# Patient Record
Sex: MALE | Race: WHITE | Employment: OTHER | URBAN - METROPOLITAN AREA
[De-identification: names, ages, dates, MRNs, and addresses within clinical notes are randomized per-mention and may not be internally consistent; named-entity substitution may affect disease eponyms.]

---

## 2017-01-03 RX ORDER — METOPROLOL SUCCINATE 25 MG/1
TABLET, EXTENDED RELEASE ORAL
Qty: 30 TAB | Refills: 6 | Status: SHIPPED | OUTPATIENT
Start: 2017-01-03 | End: 2017-07-07 | Stop reason: SDUPTHER

## 2017-07-07 ENCOUNTER — OFFICE VISIT (OUTPATIENT)
Dept: CARDIOLOGY CLINIC | Age: 72
End: 2017-07-07

## 2017-07-07 VITALS
HEIGHT: 72 IN | BODY MASS INDEX: 29.96 KG/M2 | HEART RATE: 64 BPM | RESPIRATION RATE: 16 BRPM | WEIGHT: 221.2 LBS | DIASTOLIC BLOOD PRESSURE: 70 MMHG | SYSTOLIC BLOOD PRESSURE: 130 MMHG | OXYGEN SATURATION: 97 %

## 2017-07-07 DIAGNOSIS — I34.0 NON-RHEUMATIC MITRAL REGURGITATION: ICD-10-CM

## 2017-07-07 DIAGNOSIS — I48.0 PAROXYSMAL ATRIAL FIBRILLATION (HCC): Primary | ICD-10-CM

## 2017-07-07 DIAGNOSIS — Z86.79 S/P ABLATION OF ATRIAL FIBRILLATION: ICD-10-CM

## 2017-07-07 DIAGNOSIS — E78.2 MIXED HYPERLIPIDEMIA: ICD-10-CM

## 2017-07-07 DIAGNOSIS — I35.1 NONRHEUMATIC AORTIC VALVE INSUFFICIENCY: ICD-10-CM

## 2017-07-07 DIAGNOSIS — Z98.890 S/P ABLATION OF ATRIAL FIBRILLATION: ICD-10-CM

## 2017-07-07 RX ORDER — METOPROLOL SUCCINATE 25 MG/1
TABLET, EXTENDED RELEASE ORAL
Qty: 90 TAB | Refills: 4 | Status: SHIPPED | OUTPATIENT
Start: 2017-07-07 | End: 2018-08-01 | Stop reason: SDUPTHER

## 2017-07-07 RX ORDER — PRAVASTATIN SODIUM 40 MG/1
40 TABLET ORAL
Refills: 0 | COMMUNITY
Start: 2017-06-28

## 2017-07-07 NOTE — PROGRESS NOTES
Subjective/HPI:     Ligia Stains. is a 66 y.o. male is here for f/u appt. The patient denies chest pain/ shortness of breath, orthopnea, PND, LE edema, palpitations, syncope, presyncope or fatigue. Overall been doing well. PCP Provider  Maritza Araujo MD  Past Medical History:   Diagnosis Date    A-fib [facility])     s/p afib ablation    Arthritis     hands,knees    CAD (coronary artery disease)       Past Surgical History:   Procedure Laterality Date    CARDIAC SURG PROCEDURE UNLIST      ablation for afib    HX GI      hemorroidectomy    HX HERNIA REPAIR      HX OTHER SURGICAL      monitoring device under skin in chest to dx atrial fibrillation    HX TONSILLECTOMY      HX VASECTOMY       Allergies   Allergen Reactions    Claritin [Loratadine] Other (comments)     \"stop urination\"      Family History   Problem Relation Age of Onset    Heart Disease Father       Current Outpatient Prescriptions   Medication Sig    pravastatin (PRAVACHOL) 40 mg tablet Take 40 mg by mouth.  metoprolol succinate (TOPROL-XL) 25 mg XL tablet take 1 tablet by mouth once daily    ibuprofen (ADVIL) 200 mg tablet Take 600 mg by mouth as needed for Pain.  tadalafil (CIALIS) 5 mg tablet Take 5 mg by mouth every other day.  aspirin 81 mg tablet Take 81 mg by mouth daily. No current facility-administered medications for this visit. Vitals:    07/07/17 0943   BP: 130/70   Pulse: 64   Resp: 16   SpO2: 97%   Weight: 221 lb 3.2 oz (100.3 kg)   Height: 6' (1.829 m)     Social History     Social History    Marital status:      Spouse name: N/A    Number of children: N/A    Years of education: N/A     Occupational History    Not on file.      Social History Main Topics    Smoking status: Former Smoker     Packs/day: 1.00     Years: 8.00     Types: Cigarettes     Quit date: 7/13/1973    Smokeless tobacco: Never Used      Comment: Former cigarette smoker    Alcohol use 3.5 oz/week     7 Cans of beer per week      Comment: 1 beer a day    Drug use: No    Sexual activity: Not on file     Other Topics Concern    Not on file     Social History Narrative       I have reviewed the nurses notes, vitals, problem list, allergy list, medical history, family, social history and medications. Review of Symptoms:    General: Pt denies excessive weight gain or loss. Pt is able to conduct ADL's  HEENT: Denies blurred vision, headaches, epistaxis and difficulty swallowing. Respiratory: Denies shortness of breath, KULKARNI, wheezing or stridor. Cardiovascular: Denies precordial pain, palpitations, edema or PND  Gastrointestinal: Denies poor appetite, indigestion, abdominal pain or blood in stool  Urinary: Denies dysuria, pyuria  Musculoskeletal: Denies pain or swelling from muscles or joints  Neurologic: Denies tremor, paresthesias, or sensory motor disturbance  Skin: Denies rash, itching or texture change. Psych: Denies depression        Physical Exam:      General: Well developed, in no acute distress, cooperative and alert  HEENT: No carotid bruits, no JVD, trach is midline. Neck Supple, PEERL, EOM intact. Heart:  Normal S1/S2 negative S3 or S4. Regular, +GR I/II JULIAN, no gallop or rub.   Respiratory: Clear bilaterally x 4, no wheezing or rales  Abdomen:   Soft, non-tender, no masses, bowel sounds are active.   Extremities:  No edema, normal cap refill, no cyanosis, atraumatic. Neuro: A&Ox3, speech clear, gait stable. Skin: Skin color is normal. No rashes or lesions.  Non diaphoretic  Vascular: 2+ pulses symmetric in all extremities    Cardiographics    ECG: SR, HR 64    Results for orders placed or performed during the hospital encounter of 10/13/14   EKG, 12 LEAD, INITIAL   Result Value Ref Range    Ventricular Rate 80 BPM    Atrial Rate 80 BPM    P-R Interval 174 ms    QRS Duration 98 ms    Q-T Interval 372 ms    QTC Calculation (Bezet) 429 ms    Calculated P Axis 44 degrees    Calculated R Axis -21 degrees    Calculated T Axis 27 degrees    Diagnosis       Normal sinus rhythm  Minimal voltage criteria for LVH, may be normal variant  Confirmed by Antoinette Allen ([id number]) on 10/15/2014 9:49:44 AM         Cardiology Labs:  No results found for: CHOL, CHOLX, CHLST, CHOLV, 264164, HDL, LDL, LDLC, DLDLP, Merla Reece, CHHD, [facility]    Lab Results   Component Value Date/Time    Sodium 142 10/13/2014 11:40 PM    Potassium 4.7 10/13/2014 11:40 PM    Chloride 106 10/13/2014 11:40 PM    CO2 32 10/13/2014 11:40 PM    Anion gap 4 10/13/2014 11:40 PM    Glucose 122 10/13/2014 11:40 PM    BUN 14 10/13/2014 11:40 PM    Creatinine 1.06 10/13/2014 11:40 PM    BUN/Creatinine ratio 13 10/13/2014 11:40 PM    GFR est AA >60 10/13/2014 11:40 PM    GFR est non-AA >60 10/13/2014 11:40 PM    Calcium 9.4 10/13/2014 11:40 PM    AST (SGOT) 18 10/13/2014 11:40 PM    Alk. phosphatase 116 10/13/2014 11:40 PM    Protein, total 7.2 10/13/2014 11:40 PM    Albumin 3.7 10/13/2014 11:40 PM    Globulin 3.5 10/13/2014 11:40 PM    A-G Ratio 1.1 10/13/2014 11:40 PM    ALT (SGPT) 25 10/13/2014 11:40 PM           Assessment:     Assessment:     Zenaida Hernandez was seen today for cholesterol problem. Diagnoses and all orders for this visit:    Paroxysmal atrial fibrillation (HCC)  -     metoprolol succinate (TOPROL-XL) 25 mg XL tablet; take 1 tablet by mouth once daily    S/P ablation of atrial fibrillation    Mixed hyperlipidemia  -     AMB POC EKG ROUTINE W/ 12 LEADS, INTER & REP    Non-rheumatic mitral regurgitation    Nonrheumatic aortic valve insufficiency        ICD-10-CM ICD-9-CM    1. Paroxysmal atrial fibrillation (HCC) I48.0 427.31 metoprolol succinate (TOPROL-XL) 25 mg XL tablet   2. S/P ablation of atrial fibrillation Z98.890 V45.89     Z86.79     3. Mixed hyperlipidemia E78.2 272.2 pravastatin (PRAVACHOL) 40 mg tablet      AMB POC EKG ROUTINE W/ 12 LEADS, INTER & REP   4. Non-rheumatic mitral regurgitation I34.0 424.0    5.  Nonrheumatic aortic valve insufficiency I35.1 424.1      Orders Placed This Encounter    AMB POC EKG ROUTINE W/ 12 LEADS, INTER & REP     Order Specific Question:   Reason for Exam:     Answer:   routine    pravastatin (PRAVACHOL) 40 mg tablet     Sig: Take 40 mg by mouth. Refill:  0    metoprolol succinate (TOPROL-XL) 25 mg XL tablet     Sig: take 1 tablet by mouth once daily     Dispense:  90 Tab     Refill:  4        Plan:     Patient presents today for annual f/u appt and denies cardiac complaints. He remains in SR with hx of afib ablation. BP is normotensive. Lipids and labs managed by PCP, LDL 87, LDL 44, trig 134. Continue current care and f/u in 1 year. Chito Holt NP      [facility]    7/7/2017         Agree with note as outlined by  NP. I confirm findings in history and physical exam. No additional findings noted. Agree with plan as outlined above.      [address] MD

## 2017-07-07 NOTE — MR AVS SNAPSHOT
Visit Information Date & Time Provider Department Dept. Phone Encounter #  
 7/7/2017  9:45 AM [address], MD [facility] [phone] [id number] Upcoming Health Maintenance Date Due DTaP/Tdap/Td series (1 - Tdap) 8/31/1965 FOBT Q 1 YEAR AGE 50-75 8/31/1994 ZOSTER VACCINE AGE 60> 8/31/2004 GLAUCOMA SCREENING Q2Y 8/31/2009 MEDICARE YEARLY EXAM 8/31/2009 Pneumococcal 65+ Low/Medium Risk (2 of 2 - PPSV23) 1/1/2013 INFLUENZA AGE 9 TO ADULT 8/1/2017 Allergies as of 7/7/2017  Review Complete On: 7/7/2017 By: Regis Dent NP Severity Noted Reaction Type Reactions Claritin [Loratadine]  04/17/2012    Other (comments) \"stop urination\" Current Immunizations  Never Reviewed Name Date Influenza Vaccine Split  Deferred (Patient Refused) Pneumococcal Vaccine (Unspecified Type) 1/1/2008 Not reviewed this visit You Were Diagnosed With   
  
 Codes Comments Paroxysmal atrial fibrillation (HCC)    -  Primary ICD-10-CM: I48.0 ICD-9-CM: 427.31 S/P ablation of atrial fibrillation     ICD-10-CM: Z98.890, Z86.79 
ICD-9-CM: V45.89 Mixed hyperlipidemia     ICD-10-CM: E78.2 ICD-9-CM: 272.2 Non-rheumatic mitral regurgitation     ICD-10-CM: I34.0 ICD-9-CM: 424.0 Nonrheumatic aortic valve insufficiency     ICD-10-CM: I35.1 ICD-9-CM: 424.1 Vitals BP Pulse Resp Height(growth percentile) Weight(growth percentile) SpO2  
 130/70 (BP Patient Position: Sitting) 64 16 6' (1.829 m) 221 lb 3.2 oz (100.3 kg) 97% BMI Smoking Status 30 kg/m2 Former Smoker BMI and BSA Data Body Mass Index Body Surface Area  
 30 kg/m 2 2.26 m 2 Preferred Pharmacy [facility] Phone [id number] [address] [facility] [phone] Your Updated Medication List  
  
   
 This list is accurate as of: 7/7/17 10:08 AM.  Always use your most recent med list. ADVIL 200 mg tablet Generic drug:  ibuprofen Take 600 mg by mouth as needed for Pain. aspirin 81 mg tablet Take 81 mg by mouth daily. CIALIS 5 mg tablet Generic drug:  tadalafil Take 5 mg by mouth every other day. metoprolol succinate 25 mg XL tablet Commonly known as:  TOPROL-XL  
take 1 tablet by mouth once daily  
  
 pravastatin 40 mg tablet Commonly known as:  PRAVACHOL Take 40 mg by mouth. Prescriptions Sent to Pharmacy Refills  
 metoprolol succinate (TOPROL-XL) 25 mg XL tablet 4 Sig: take 1 tablet by mouth once daily Class: Normal  
 Pharmacy: [facility] [address], [address] [place] [address] #: [phone] We Performed the Following AMB POC EKG ROUTINE W/ 12 LEADS, INTER & REP [08040 CPT(R)] Introducing [facility] & HEALTH SERVICES! [employer] introduces [employer] patient portal. Now you can access parts of your medical record, email your doctor's office, and request medication refills online. 1. In your internet browser, go to [email or web address] 2. Click on the First Time User? Click Here link in the Sign In box. You will see the New Member Sign Up page. 3. Enter your [employer] Access Code exactly as it appears below. You will not need to use this code after youve completed the sign-up process. If you do not sign up before the expiration date, you must request a new code. · [employer] Access Code: [email or web address] Expires: 10/5/2017 10:08 AM 
 
4. Enter the last four digits of your Social Security Number (xxxx) and Date of Birth (mm/dd/yyyy) as indicated and click Submit. You will be taken to the next sign-up page. 5. Create a [employer] ID. This will be your [employer] login ID and cannot be changed, so think of one that is secure and easy to remember. 6. Create a [employer] password. You can change your password at any time. 7. Enter your Password Reset Question and Answer. This can be used at a later time if you forget your password. 8. Enter your e-mail address. You will receive e-mail notification when new information is available in [address] 9. Click Sign Up. You can now view and download portions of your medical record. 10. Click the Download Summary menu link to download a portable copy of your medical information. If you have questions, please visit the Frequently Asked Questions section of the [employer] website. Remember, [employer] is NOT to be used for urgent needs. For medical emergencies, dial 911. Now available from your iPhone and Android! Please provide this summary of care documentation to your next provider. Your primary care clinician is listed as [address]. If you have any questions after today's visit, please call [phone].

## 2018-08-01 ENCOUNTER — OFFICE VISIT (OUTPATIENT)
Dept: CARDIOLOGY CLINIC | Age: 73
End: 2018-08-01

## 2018-08-01 VITALS
BODY MASS INDEX: 30.83 KG/M2 | HEART RATE: 74 BPM | WEIGHT: 227.6 LBS | SYSTOLIC BLOOD PRESSURE: 132 MMHG | OXYGEN SATURATION: 99 % | DIASTOLIC BLOOD PRESSURE: 84 MMHG | HEIGHT: 72 IN

## 2018-08-01 DIAGNOSIS — R06.02 SOB (SHORTNESS OF BREATH): Primary | ICD-10-CM

## 2018-08-01 DIAGNOSIS — I48.0 PAROXYSMAL ATRIAL FIBRILLATION (HCC): ICD-10-CM

## 2018-08-01 DIAGNOSIS — E78.2 MIXED HYPERLIPIDEMIA: ICD-10-CM

## 2018-08-01 RX ORDER — METOPROLOL SUCCINATE 25 MG/1
TABLET, EXTENDED RELEASE ORAL
Qty: 90 TAB | Refills: 4 | Status: SHIPPED | OUTPATIENT
Start: 2018-08-01 | End: 2019-08-16 | Stop reason: SDUPTHER

## 2018-08-01 NOTE — PROGRESS NOTES
Chief Complaint Patient presents with  Irregular Heart Beat ANNUAL F/U  Shortness of Breath 1. Have you been to the ER, urgent care clinic since your last visit? Hospitalized since your last visit? NO 
 
2. Have you seen or consulted any other health care providers outside of the [address] [place] [address] since your last visit? Include any pap smears or colon screening.  NO

## 2018-08-01 NOTE — MR AVS SNAPSHOT
[address] [facility] 
[phone] Patient: Kandis Klinefelter MRN: [id number] Visit Information Date & Time Provider Department Dept. Phone Encounter #  
 8/1/2018  9:15 AM [address], [address] [place] [address] [facility] [phone] [id number] Your Appointments 8/10/2018  8:00 AM  
NUCLEAR MEDICINE with NUCLEAR, [facility]) Appt Note: p/Dr R; STRESS TEST LEXISCAN/CARDIOLITE [[id number]] (Order [id number])  6ft 227lbs bg  
 [address]  
[phone] [address] Upcoming Health Maintenance Date Due DTaP/Tdap/Td series (1 - Tdap) 8/31/1965 FOBT Q 1 YEAR AGE 50-75 8/31/1994 ZOSTER VACCINE AGE 60> 6/30/2004 GLAUCOMA SCREENING Q2Y 8/31/2009 Pneumococcal 65+ Low/Medium Risk (2 of 2 - PPSV23) 1/1/2013 MEDICARE YEARLY EXAM 3/14/2018 Influenza Age 5 to Adult 8/1/2018 Allergies as of 8/1/2018  Review Complete On: 8/1/2018 By: Lamar Costa Severity Noted Reaction Type Reactions Claritin [Loratadine]  04/17/2012    Other (comments) \"stop urination\" Current Immunizations  Never Reviewed Name Date Influenza Vaccine Split  Deferred (Patient Refused) ZZZ-RETIRED (DO NOT USE) Pneumococcal Vaccine (Unspecified Type) 1/1/2008 Not reviewed this visit You Were Diagnosed With   
  
 Codes Comments SOB (shortness of breath)    -  Primary ICD-10-CM: R06.02 
ICD-9-CM: 786.05 Paroxysmal atrial fibrillation (HCC)     ICD-10-CM: I48.0 ICD-9-CM: 427.31 Mixed hyperlipidemia     ICD-10-CM: E78.2 ICD-9-CM: 272.2 Vitals  BP Pulse Height(growth percentile) Weight(growth percentile) SpO2 BMI  
 132/84 (BP 1 Location: Right arm, BP Patient Position: Sitting) 74 6' (1.829 m) 227 lb 9.6 oz (103.2 kg) 99% 30.87 kg/m2 Smoking Status Former Smoker Vitals History BMI and BSA Data Body Mass Index Body Surface Area  
 30.87 kg/m 2 2.29 m 2 Preferred Pharmacy [facility] Phone [id number] [address] [phone] Your Updated Medication List  
  
   
This list is accurate as of 8/1/18 10:31 AM.  Always use your most recent med list. ADVIL 200 mg tablet Generic drug:  ibuprofen Take 600 mg by mouth as needed for Pain. aspirin 81 mg tablet Take 81 mg by mouth daily. CIALIS 5 mg tablet Generic drug:  tadalafil Take 5 mg by mouth every other day. metoprolol succinate 25 mg XL tablet Commonly known as:  TOPROL-XL  
take 1 tablet by mouth once daily  
  
 pravastatin 40 mg tablet Commonly known as:  PRAVACHOL Take 40 mg by mouth. Prescriptions Sent to Pharmacy Refills  
 metoprolol succinate (TOPROL-XL) 25 mg XL tablet 4 Sig: take 1 tablet by mouth once daily Class: Normal  
 Pharmacy: [id number] [address], [address] [place] [address] #: [phone] We Performed the Following AMB POC EKG ROUTINE W/ 12 LEADS, INTER & REP [57217 CPT(R)] To-Do List   
 Around 08/02/2018 ECG:  STRESS TEST LEXISCAN/CARDIOLITE Introducing \[facility]\"" & HEALTH SERVICES! [employer] introduces [employer] patient portal. Now you can access parts of your medical record, email your doctor's office, and request medication refills online. 1. In your internet browser, go to [email or web address]). [email or web address] ([employer]) 2. Click on the First Time User? Click Here link in the Sign In box. You will see the New Member Sign Up page. 3. Enter your [employer] Access Code exactly as it appears below. You will not need to use this code after youve completed the sign-up process.  If you do not sign up before the expiration date, you must request a new code. · [employer] Access Code: [email or web address] Expires: 10/30/2018  8:59 AM 
 
4. Enter the last four digits of your Social Security Number (xxxx) and Date of Birth (mm/dd/yyyy) as indicated and click Submit. You will be taken to the next sign-up page. 5. Create a [employer] ID. This will be your [employer] login ID and cannot be changed, so think of one that is secure and easy to remember. 6. Create a [employer] password. You can change your password at any time. 7. Enter your Password Reset Question and Answer. This can be used at a later time if you forget your password. 8. Enter your e-mail address. You will receive e-mail notification when new information is available in [address] 9. Click Sign Up. You can now view and download portions of your medical record. 10. Click the Download Summary menu link to download a portable copy of your medical information. If you have questions, please visit the Frequently Asked Questions section of the [employer] website. Remember, [employer] is NOT to be used for urgent needs. For medical emergencies, dial 911. Now available from your iPhone and Android! Please provide this summary of care documentation to your next provider. Your primary care clinician is listed as [address]. If you have any questions after today's visit, please call [phone].

## 2018-08-06 NOTE — PROGRESS NOTES
NAME:  Jossie Bedolla   :      MRN:   [id number]   PCP:  Sarah Perez MD           Subjective: The patient is a 67y.o. year old male  who returns for a routine follow-up. Since the last visit, patient reports no change in exercise tolerance, chest pain, edema, medication intolerance, palpitations,  PND/orthopnea wheezing, sputum, syncope, dizziness or light headedness. C/o SOB with exertion. Past Medical History:   Diagnosis Date    A-fib [facility])     s/p afib ablation    Arthritis     hands,knees    CAD (coronary artery disease)         ICD-10-CM ICD-9-CM    1. SOB (shortness of breath) R06.02 786.05 STRESS TEST LEXISCAN/CARDIOLITE   2. Paroxysmal atrial fibrillation (HCC) I48.0 427.31 AMB POC EKG ROUTINE W/ 12 LEADS, INTER & REP      metoprolol succinate (TOPROL-XL) 25 mg XL tablet   3. Mixed hyperlipidemia E78.2 272.2       Social History   Substance Use Topics    Smoking status: Former Smoker     Packs/day: 1.00     Years: 8.00     Types: Cigarettes     Quit date: 1973    Smokeless tobacco: Never Used      Comment: Former cigarette smoker    Alcohol use 3.5 oz/week     7 Cans of beer per week      Comment: 1 beer a day      Family History   Problem Relation Age of Onset    Heart Disease Father         Review of Systems  General: Pt denies excessive weight gain or loss. Pt is able to conduct ADL's  HEENT: Denies blurred vision, headaches, epistaxis and difficulty swallowing. Respiratory: Denies shortness of breath, KUKLARNI, wheezing or stridor. Cardiovascular: Denies precordial pain, palpitations, edema or PND  Gastrointestinal: Denies poor appetite, indigestion, abdominal pain or blood in stool  Musculoskeletal: Denies pain or swelling from muscles or joints  Neurologic: Denies tremor, paresthesias, or sensory motor disturbance  Skin: Denies rash, itching or texture change.     Objective:       Vitals:    18 0912 18 0918   BP: 136/84 132/84 Pulse: 74    SpO2: 99%    Weight: 227 lb 9.6 oz (103.2 kg)    Height: 6' (1.829 m)     Body mass index is 30.87 kg/(m^2). General PE  Mental Status - Alert. General Appearance - Not in acute distress. Chest and Lung Exam   Inspection: Accessory muscles - No use of accessory muscles in breathing. Auscultation:   Breath sounds: - Normal.    Cardiovascular   Inspection: Jugular vein - Bilateral - Inspection Normal.  Palpation/Percussion:   Apical Impulse: - Normal.  Auscultation: Rhythm - Regular. Heart Sounds - S1 WNL and S2 WNL. No S3 or S4. Murmurs & Other Heart Sounds: Auscultation of the heart reveals - No Murmurs. Peripheral Vascular   Upper Extremity: Inspection - Bilateral - No Cyanotic nailbeds or Digital clubbing. Lower Extremity:   Palpation: Edema - Bilateral - No edema. Data Review:     EKG -EKG: normal EKG, normal sinus rhythm, unchanged from previous tracings. Medications reviewed  Current Outpatient Prescriptions   Medication Sig    metoprolol succinate (TOPROL-XL) 25 mg XL tablet take 1 tablet by mouth once daily    pravastatin (PRAVACHOL) 40 mg tablet Take 40 mg by mouth.  ibuprofen (ADVIL) 200 mg tablet Take 600 mg by mouth as needed for Pain.  tadalafil (CIALIS) 5 mg tablet Take 5 mg by mouth every other day.  aspirin 81 mg tablet Take 81 mg by mouth daily. No current facility-administered medications for this visit. Assessment:       ICD-10-CM ICD-9-CM    1. SOB (shortness of breath) R06.02 786.05 STRESS TEST LEXISCAN/CARDIOLITE   2. Paroxysmal atrial fibrillation (HCC) I48.0 427.31 AMB POC EKG ROUTINE W/ 12 LEADS, INTER & REP      metoprolol succinate (TOPROL-XL) 25 mg XL tablet   3. Mixed hyperlipidemia E78.2 272.2         Plan:     Patient presents with SOB with exertion. He is maintaining sinus rhythm. Will evaluate for ischemia as noted.  F/u in 6 months if normal.    Florencia Amor MD

## 2018-08-10 ENCOUNTER — CLINICAL SUPPORT (OUTPATIENT)
Dept: CARDIOLOGY CLINIC | Age: 73
End: 2018-08-10

## 2018-08-10 DIAGNOSIS — R06.02 SOB (SHORTNESS OF BREATH): ICD-10-CM

## 2018-08-14 NOTE — PROGRESS NOTES
Spoke with Palmira Araujo on HIPAA  Regarding STRESS test results. Verified patient with two identifiers.

## 2019-07-07 ENCOUNTER — APPOINTMENT (OUTPATIENT)
Dept: CT IMAGING | Age: 74
End: 2019-07-07
Payer: MEDICARE

## 2019-07-07 ENCOUNTER — HOSPITAL ENCOUNTER (EMERGENCY)
Age: 74
Discharge: HOME | End: 2019-07-07
Attending: EMERGENCY MEDICINE
Payer: MEDICARE

## 2019-07-07 VITALS
HEART RATE: 67 BPM | RESPIRATION RATE: 16 BRPM | HEIGHT: 72 IN | WEIGHT: 217.59 LBS | TEMPERATURE: 98 F | OXYGEN SATURATION: 96 % | DIASTOLIC BLOOD PRESSURE: 74 MMHG | SYSTOLIC BLOOD PRESSURE: 161 MMHG | BODY MASS INDEX: 29.47 KG/M2

## 2019-07-07 DIAGNOSIS — N20.0 KIDNEY STONE: Primary | ICD-10-CM

## 2019-07-07 DIAGNOSIS — R10.9 ACUTE RIGHT FLANK PAIN: ICD-10-CM

## 2019-07-07 LAB
ANION GAP SERPL CALC-SCNC: 4 MMOL/L (ref 5–15)
APPEARANCE UR: CLEAR
BACTERIA URNS QL MICRO: NEGATIVE /HPF
BASOPHILS # BLD: 0 K/UL (ref 0–0.1)
BASOPHILS NFR BLD: 0 % (ref 0–1)
BILIRUB UR QL: NEGATIVE
BUN SERPL-MCNC: 20 MG/DL (ref 6–20)
BUN/CREAT SERPL: 12 (ref 12–20)
CALCIUM SERPL-MCNC: 9.2 MG/DL (ref 8.5–10.1)
CHLORIDE SERPL-SCNC: 105 MMOL/L (ref 97–108)
CO2 SERPL-SCNC: 30 MMOL/L (ref 21–32)
COLOR UR: ABNORMAL
CREAT SERPL-MCNC: 1.69 MG/DL (ref 0.7–1.3)
DIFFERENTIAL METHOD BLD: NORMAL
EOSINOPHIL # BLD: 0.1 K/UL (ref 0–0.4)
EOSINOPHIL NFR BLD: 1 % (ref 0–7)
EPITH CASTS URNS QL MICRO: ABNORMAL /LPF
ERYTHROCYTE [DISTWIDTH] IN BLOOD BY AUTOMATED COUNT: 13.1 % (ref 11.5–14.5)
GLUCOSE SERPL-MCNC: 139 MG/DL (ref 65–100)
GLUCOSE UR STRIP.AUTO-MCNC: NEGATIVE MG/DL
HCT VFR BLD AUTO: 47.9 % (ref 36.6–50.3)
HGB BLD-MCNC: 15.7 G/DL (ref 12.1–17)
HGB UR QL STRIP: ABNORMAL
IMM GRANULOCYTES # BLD AUTO: 0 K/UL (ref 0–0.04)
IMM GRANULOCYTES NFR BLD AUTO: 0 % (ref 0–0.5)
KETONES UR QL STRIP.AUTO: NEGATIVE MG/DL
LEUKOCYTE ESTERASE UR QL STRIP.AUTO: ABNORMAL
LYMPHOCYTES # BLD: 1.4 K/UL (ref 0.8–3.5)
LYMPHOCYTES NFR BLD: 16 % (ref 12–49)
MCH RBC QN AUTO: 30.1 PG (ref 26–34)
MCHC RBC AUTO-ENTMCNC: 32.8 G/DL (ref 30–36.5)
MCV RBC AUTO: 91.8 FL (ref 80–99)
MONOCYTES # BLD: 0.9 K/UL (ref 0–1)
MONOCYTES NFR BLD: 10 % (ref 5–13)
NEUTS SEG # BLD: 6.5 K/UL (ref 1.8–8)
NEUTS SEG NFR BLD: 73 % (ref 32–75)
NITRITE UR QL STRIP.AUTO: NEGATIVE
NRBC # BLD: 0 K/UL (ref 0–0.01)
NRBC BLD-RTO: 0 PER 100 WBC
PH UR STRIP: 5.5 [PH] (ref 5–8)
PLATELET # BLD AUTO: 184 K/UL (ref 150–400)
PMV BLD AUTO: 10.4 FL (ref 8.9–12.9)
POTASSIUM SERPL-SCNC: 4.7 MMOL/L (ref 3.5–5.1)
PROT UR STRIP-MCNC: NEGATIVE MG/DL
RBC # BLD AUTO: 5.22 M/UL (ref 4.1–5.7)
RBC #/AREA URNS HPF: ABNORMAL /HPF (ref 0–5)
SODIUM SERPL-SCNC: 139 MMOL/L (ref 136–145)
SP GR UR REFRACTOMETRY: 1.02 (ref 1–1.03)
UA: UC IF INDICATED,UAUC: ABNORMAL
UROBILINOGEN UR QL STRIP.AUTO: 0.2 EU/DL (ref 0.2–1)
WBC # BLD AUTO: 9.1 K/UL (ref 4.1–11.1)
WBC URNS QL MICRO: ABNORMAL /HPF (ref 0–4)

## 2019-07-07 PROCEDURE — 85025 COMPLETE CBC W/AUTO DIFF WBC: CPT

## 2019-07-07 PROCEDURE — 74176 CT ABD & PELVIS W/O CONTRAST: CPT

## 2019-07-07 PROCEDURE — 74011250637 HC RX REV CODE- 250/637: Performed by: PHYSICIAN ASSISTANT

## 2019-07-07 PROCEDURE — 96374 THER/PROPH/DIAG INJ IV PUSH: CPT

## 2019-07-07 PROCEDURE — 80048 BASIC METABOLIC PNL TOTAL CA: CPT

## 2019-07-07 PROCEDURE — 36415 COLL VENOUS BLD VENIPUNCTURE: CPT

## 2019-07-07 PROCEDURE — 99283 EMERGENCY DEPT VISIT LOW MDM: CPT

## 2019-07-07 PROCEDURE — 81001 URINALYSIS AUTO W/SCOPE: CPT

## 2019-07-07 PROCEDURE — 74011250636 HC RX REV CODE- 250/636: Performed by: PHYSICIAN ASSISTANT

## 2019-07-07 RX ORDER — SODIUM CHLORIDE 0.9 % (FLUSH) 0.9 %
5-40 SYRINGE (ML) INJECTION AS NEEDED
Status: DISCONTINUED | OUTPATIENT
Start: 2019-07-07 | End: 2019-07-07 | Stop reason: HOSPADM

## 2019-07-07 RX ORDER — TAMSULOSIN HYDROCHLORIDE 0.4 MG/1
0.4 CAPSULE ORAL
Status: COMPLETED | OUTPATIENT
Start: 2019-07-07 | End: 2019-07-07

## 2019-07-07 RX ORDER — SODIUM CHLORIDE 0.9 % (FLUSH) 0.9 %
5-40 SYRINGE (ML) INJECTION EVERY 8 HOURS
Status: DISCONTINUED | OUTPATIENT
Start: 2019-07-07 | End: 2019-07-07 | Stop reason: HOSPADM

## 2019-07-07 RX ORDER — KETOROLAC TROMETHAMINE 30 MG/ML
15 INJECTION, SOLUTION INTRAMUSCULAR; INTRAVENOUS
Status: COMPLETED | OUTPATIENT
Start: 2019-07-07 | End: 2019-07-07

## 2019-07-07 RX ORDER — ONDANSETRON 4 MG/1
4 TABLET, ORALLY DISINTEGRATING ORAL
Qty: 10 TAB | Refills: 0 | Status: SHIPPED | OUTPATIENT
Start: 2019-07-07 | End: 2019-09-05

## 2019-07-07 RX ORDER — TRAMADOL HYDROCHLORIDE 50 MG/1
50 TABLET ORAL
Qty: 8 TAB | Refills: 0 | Status: SHIPPED | OUTPATIENT
Start: 2019-07-07 | End: 2019-07-12

## 2019-07-07 RX ADMIN — TAMSULOSIN HYDROCHLORIDE 0.4 MG: 0.4 CAPSULE ORAL at 15:14

## 2019-07-07 RX ADMIN — KETOROLAC TROMETHAMINE 15 MG: 30 INJECTION, SOLUTION INTRAMUSCULAR at 15:14

## 2019-07-07 NOTE — ED NOTES
Pt presents from triage with c/o intermittent R flank pain since Friday. States some mild nausea with pain. Denies urinary s/s, v/d, fever, or chills. Pt placed on monitor x2. VSS. Spouse at bedside.

## 2019-07-07 NOTE — ED NOTES
Discharge instructions given to patient by MARY Barragan. Pt verbalized understanding of discharge instructions. Pt discharged without difficulty. Pt discharged in stable condition via ambulation, accompanied by wife.

## 2019-07-07 NOTE — DISCHARGE INSTRUCTIONS
Rest, push fluids, follow up with Urology for recheck. Contact information provided for [facility] Hotline for follow up: [phone] Return to the Emergency Dept for any worsening pain, fever, nausea/vomiting, difficulty urinating.

## 2019-07-08 NOTE — ED PROVIDER NOTES
EMERGENCY DEPARTMENT HISTORY AND PHYSICAL EXAM      Date: 7/7/2019  Patient Name: Disha Flores    History of Presenting Illness     Chief Complaint   Patient presents with    Flank Pain     R flank/lower back pain, intermittent since Friday; denies any urinary sx's, denies n/v; denies hx of kidney stones       History Provided By: Patient    HPI: Disha Flores, 75 y.o. male presents ambulatory to the Emergency Dept with c/o R flank/low back pain since 7/5/19. Pt denied dysuria/hematuria. Pt denied h/o kidney stones but states he has multiple family members with stones. No fever/chills. He denied injury/strain or recent change in his physical routine. No N/V. He has been making his urine stream without difficulty. No rash/lesion. Pt is o/w healthy without cough, congestion, ST, shortness of breath, chest pain. Chief Complaint: R flank pain  Duration: 2 Days  Timing:  Acute  Location: R flank  Quality: Aching and Dull  Severity: Moderate  Modifying Factors: +family h/o kidney stones  Associated Symptoms: denies any other associated signs or symptoms        There are no other complaints, changes, or physical findings at this time. PCP: Mayi Colon MD    Current Outpatient Medications   Medication Sig Dispense Refill    traMADol (ULTRAM) 50 mg tablet Take 1 Tab by mouth every eight (8) hours as needed for Pain for up to 5 days. Max Daily Amount: 150 mg. 8 Tab 0    ondansetron (ZOFRAN ODT) 4 mg disintegrating tablet Take 1 Tab by mouth every eight (8) hours as needed for Nausea for up to 10 doses. 10 Tab 0    metoprolol succinate (TOPROL-XL) 25 mg XL tablet take 1 tablet by mouth once daily 90 Tab 4    pravastatin (PRAVACHOL) 40 mg tablet Take 40 mg by mouth.  0    ibuprofen (ADVIL) 200 mg tablet Take 600 mg by mouth as needed for Pain.  tadalafil (CIALIS) 5 mg tablet Take 5 mg by mouth every other day.  aspirin 81 mg tablet Take 81 mg by mouth daily.          Past History Past Medical History:  Past Medical History:   Diagnosis Date    A-fib [facility])     s/p afib ablation    Arthritis     hands,knees    CAD (coronary artery disease)        Past Surgical History:  Past Surgical History:   Procedure Laterality Date    CARDIAC SURG PROCEDURE UNLIST      ablation for afib    HX GI      hemorroidectomy    HX HERNIA REPAIR      HX OTHER SURGICAL      monitoring device under skin in chest to dx atrial fibrillation    HX TONSILLECTOMY      HX VASECTOMY         Family History:  Family History   Problem Relation Age of Onset    Heart Disease Father        Social History:  Social History     Tobacco Use    Smoking status: Former Smoker     Packs/day: 1.00     Years: 8.00     Pack years: 8.00     Types: Cigarettes     Last attempt to quit: 1973     Years since quittin.0    Smokeless tobacco: Never Used    Tobacco comment: Former cigarette smoker   Substance Use Topics    Alcohol use: Yes     Alcohol/week: 3.5 oz     Types: 7 Cans of beer per week     Comment: 1 beer a day    Drug use: No       Allergies: Allergies   Allergen Reactions    Claritin [Loratadine] Other (comments)     \"stop urination\"         Review of Systems   Review of Systems   Constitutional: Negative for chills and fever. HENT: Negative for congestion, rhinorrhea and sore throat. Respiratory: Negative for cough and shortness of breath. Cardiovascular: Negative for chest pain and palpitations. Gastrointestinal: Negative for abdominal pain, diarrhea, nausea and vomiting. Endocrine: Negative for polydipsia, polyphagia and polyuria. Genitourinary: Positive for flank pain. Negative for decreased urine volume, dysuria and hematuria. Musculoskeletal: Positive for back pain. Negative for neck pain and neck stiffness. Skin: Negative for rash and wound. Allergic/Immunologic: Negative for food allergies and immunocompromised state. Neurological: Negative for dizziness and headaches. Hematological: Negative for adenopathy. Does not bruise/bleed easily. Psychiatric/Behavioral: Negative for agitation and confusion. Physical Exam   Physical Exam   Constitutional: He is oriented to person, place, and time. He appears well-developed and well-nourished. No distress. WDWN elderly male, alert, in NAD   HENT:   Head: Normocephalic and atraumatic. Nose: Nose normal.   Mouth/Throat: Oropharynx is clear and moist. No oropharyngeal exudate. Eyes: Pupils are equal, round, and reactive to light. Conjunctivae and EOM are normal. Right eye exhibits no discharge. Left eye exhibits no discharge. No scleral icterus. Neck: Normal range of motion. Neck supple. No JVD present. No tracheal deviation present. No thyromegaly present. Cardiovascular: Normal rate, regular rhythm and normal heart sounds. Pulmonary/Chest: Effort normal and breath sounds normal. No respiratory distress. He has no wheezes. Abdominal: Soft. He exhibits no distension. There is no rebound and no guarding.   + R CVAT   Musculoskeletal: Normal range of motion. He exhibits no edema or tenderness. Lymphadenopathy:     He has no cervical adenopathy. Neurological: He is alert and oriented to person, place, and time. He exhibits normal muscle tone. Coordination normal.   Skin: Skin is warm and dry. No rash noted. He is not diaphoretic. No erythema. No pallor. Psychiatric: He has a normal mood and affect. His behavior is normal. Judgment normal.   Nursing note and vitals reviewed.       Diagnostic Study Results     Labs -     Recent Results (from the past 12 hour(s))   URINALYSIS W/ REFLEX CULTURE    Collection Time: 07/07/19  1:04 PM   Result Value Ref Range    Color YELLOW/STRAW      Appearance CLEAR CLEAR      Specific gravity 1.019 1.003 - 1.030      pH (UA) 5.5 5.0 - 8.0      Protein NEGATIVE  NEG mg/dL    Glucose NEGATIVE  NEG mg/dL    Ketone NEGATIVE  NEG mg/dL    Bilirubin NEGATIVE  NEG      Blood MODERATE (A) NEG Urobilinogen 0.2 0.2 - 1.0 EU/dL    Nitrites NEGATIVE  NEG      Leukocyte Esterase SMALL (A) NEG      WBC 0-4 0 - 4 /hpf    RBC 20-50 0 - 5 /hpf    Epithelial cells FEW FEW /lpf    Bacteria NEGATIVE  NEG /hpf    UA:UC IF INDICATED CULTURE NOT INDICATED BY UA RESULT CNI     CBC WITH AUTOMATED DIFF    Collection Time: 07/07/19  1:09 PM   Result Value Ref Range    WBC 9.1 4.1 - 11.1 K/uL    RBC 5.22 4.10 - 5.70 M/uL    HGB 15.7 12.1 - 17.0 g/dL    HCT 47.9 36.6 - 50.3 %    MCV 91.8 80.0 - 99.0 FL    MCH 30.1 26.0 - 34.0 PG    MCHC 32.8 30.0 - 36.5 g/dL    RDW 13.1 11.5 - 14.5 %    PLATELET 776 968 - 027 K/uL    MPV 10.4 8.9 - 12.9 FL    NRBC 0.0 0  WBC    ABSOLUTE NRBC 0.00 0.00 - 0.01 K/uL    NEUTROPHILS 73 32 - 75 %    LYMPHOCYTES 16 12 - 49 %    MONOCYTES 10 5 - 13 %    EOSINOPHILS 1 0 - 7 %    BASOPHILS 0 0 - 1 %    IMMATURE GRANULOCYTES 0 0.0 - 0.5 %    ABS. NEUTROPHILS 6.5 1.8 - 8.0 K/UL    ABS. LYMPHOCYTES 1.4 0.8 - 3.5 K/UL    ABS. MONOCYTES 0.9 0.0 - 1.0 K/UL    ABS. EOSINOPHILS 0.1 0.0 - 0.4 K/UL    ABS. BASOPHILS 0.0 0.0 - 0.1 K/UL    ABS. IMM. GRANS. 0.0 0.00 - 0.04 K/UL    DF AUTOMATED     METABOLIC PANEL, BASIC    Collection Time: 07/07/19  1:09 PM   Result Value Ref Range    Sodium 139 136 - 145 mmol/L    Potassium 4.7 3.5 - 5.1 mmol/L    Chloride 105 97 - 108 mmol/L    CO2 30 21 - 32 mmol/L    Anion gap 4 (L) 5 - 15 mmol/L    Glucose 139 (H) 65 - 100 mg/dL    BUN 20 6 - 20 MG/DL    Creatinine 1.69 (H) 0.70 - 1.30 MG/DL    BUN/Creatinine ratio 12 12 - 20      GFR est AA 48 (L) >60 ml/min/1.73m2    GFR est non-AA 40 (L) >60 ml/min/1.73m2    Calcium 9.2 8.5 - 10.1 MG/DL       Radiologic Studies -   CT ABD PELV WO CONT   Final Result   IMPRESSION:      Obstructing 4 mm calcified stone in the proximal right ureter with mild   prominence of the collecting system and mild surrounding inflammatory changes. Please refer to above findings for complete details.                CT Results  (Last 48 hours)               07/07/19 1425  CT ABD PELV WO CONT Final result    Impression:  IMPRESSION:       Obstructing 4 mm calcified stone in the proximal right ureter with mild   prominence of the collecting system and mild surrounding inflammatory changes. Please refer to above findings for complete details. Narrative:  INDICATION: Flank pain, recurrent stone disease suspected       COMPARISON: None       TECHNIQUE:    Thin axial images were obtained through the abdomen and pelvis without   intravenous iodinated contrast administration. Coronal and sagittal   reconstructions were generated. Oral contrast was not administered. CT dose   reduction was achieved through use of a standardized protocol tailored for this   examination and automatic exposure control for dose modulation. FINDINGS: Lack of IV contrast limits evaluation of the solid abdominal organs. LIVER: Low density right hepatic lobe lesion is too small to characterize (image   13). No other focal liver abnormality    GALLBLADDER: No calcified gallstone   SPLEEN: Unremarkable   PANCREAS: No mass or ductal dilatation. ADRENALS: Unremarkable. KIDNEYS/URETERS: 3.4 cm right renal cyst. No other evidence for focal renal   abnormality. There is a 4 mm calcified stone in the proximal right ureter with   minimal prominence of the collecting system and mild surrounding inflammatory   changes. Tiny nonobstructing right lower pole renal calculus. No left   hydronephrosis   PERITONEUM: No abdominal lymphadenopathy or ascites. COLON: Diverticulosis without evidence for diverticulitis   APPENDIX: Unremarkable. SMALL BOWEL: No dilatation or wall thickening. STOMACH: Unremarkable. PELVIS: No pelvic lymphadenopathy or free fluid. Mildly enlarged prostate gland. The bladder is unremarkable   BONES: No destructive bone lesion.     VISUALIZED THORAX: 6 mm lung nodule along the left major fissure is not   significantly changed compared to 2012   ADDITIONAL COMMENTS: N/A                   Medical Decision Making   I am the first provider for this patient. I reviewed the vital signs, available nursing notes, past medical history, past surgical history, family history and social history. Vital Signs-Reviewed the patient's vital signs. Patient Vitals for the past 12 hrs:   Temp Pulse Resp BP SpO2   07/07/19 1459     96 %   07/07/19 1458    161/74    07/07/19 1409     96 %   07/07/19 1407  67 16 154/68 96 %   07/07/19 1406    154/68    07/07/19 1247 98 °F (36.7 °C) 85 16 153/78 98 %         Records Reviewed: Nursing Notes, Old Medical Records, Previous Radiology Studies and Previous Laboratory Studies    Provider Notes (Medical Decision Making):   Kidney stone, UTI, pyelonephritis, strain    ED Course:   Initial assessment performed. The patients presenting problems have been discussed, and they are in agreement with the care plan formulated and outlined with them. I have encouraged them to ask questions as they arise throughout their visit. DISCHARGE NOTE:  The care plan has been outline with the patient and/or family, who verbally conveyed understanding and agreement. Available results have been reviewed. Patient and/or family understand the follow up plan as outlined and discharge instructions. Should their condition deterioration at any time after discharge the patient agrees to return, follow up sooner than outlined or seek medical assistance at the closest Emergency Room as soon as possible. Questions have been answered. Discharge instructions and educational information regarding the patient's diagnosis as well a list of reasons why the patient would want to seek immediate medical attention, should their condition change, were reviewed directly with the patient/family       PLAN:  1.    Discharge Medication List as of 7/7/2019  3:12 PM      START taking these medications    Details   traMADol (ULTRAM) 50 mg tablet Take 1 Tab by mouth every eight (8) hours as needed for Pain for up to 5 days. Max Daily Amount: 150 mg., Print, Disp-8 Tab, R-0         CONTINUE these medications which have NOT CHANGED    Details   metoprolol succinate (TOPROL-XL) 25 mg XL tablet take 1 tablet by mouth once daily, Normal, Disp-90 Tab, R-4      pravastatin (PRAVACHOL) 40 mg tablet Take 40 mg by mouth., Historical Med, R-0      ibuprofen (ADVIL) 200 mg tablet Take 600 mg by mouth as needed for Pain., Historical Med      tadalafil (CIALIS) 5 mg tablet Take 5 mg by mouth every other day., Historical Med      aspirin 81 mg tablet Take 81 mg by mouth daily. Historical Med, 81 mg           2. Follow-up Information     Follow up With Specialties Details Why Contact Info    Pedro Luis Gutiérrez MD Urology   [address]  [address]  [facility].  [phone]      [facility] EMERGENCY DEPT Emergency Medicine  If symptoms worsen [address] [place] [address] Drive  [address] [facility]  [phone]        Return to ED if worse     Diagnosis     Clinical Impression:   1. Kidney stone    2.  Acute right flank pain

## 2019-08-16 DIAGNOSIS — I48.0 PAROXYSMAL ATRIAL FIBRILLATION (HCC): ICD-10-CM

## 2019-08-16 RX ORDER — METOPROLOL SUCCINATE 25 MG/1
TABLET, EXTENDED RELEASE ORAL
Qty: 60 TAB | Refills: 0 | Status: SHIPPED | OUTPATIENT
Start: 2019-08-16 | End: 2019-10-16 | Stop reason: SDUPTHER

## 2019-09-05 ENCOUNTER — OFFICE VISIT (OUTPATIENT)
Dept: CARDIOLOGY CLINIC | Age: 74
End: 2019-09-05

## 2019-09-05 VITALS
SYSTOLIC BLOOD PRESSURE: 134 MMHG | RESPIRATION RATE: 16 BRPM | BODY MASS INDEX: 29.7 KG/M2 | HEART RATE: 70 BPM | OXYGEN SATURATION: 97 % | HEIGHT: 72 IN | DIASTOLIC BLOOD PRESSURE: 72 MMHG | WEIGHT: 219.3 LBS

## 2019-09-05 DIAGNOSIS — E78.2 MIXED HYPERLIPIDEMIA: ICD-10-CM

## 2019-09-05 DIAGNOSIS — Z86.79 S/P ABLATION OF ATRIAL FIBRILLATION: ICD-10-CM

## 2019-09-05 DIAGNOSIS — R06.02 SOB (SHORTNESS OF BREATH): ICD-10-CM

## 2019-09-05 DIAGNOSIS — Z98.890 S/P ABLATION OF ATRIAL FIBRILLATION: ICD-10-CM

## 2019-09-05 DIAGNOSIS — I48.0 PAROXYSMAL ATRIAL FIBRILLATION (HCC): Primary | ICD-10-CM

## 2019-09-05 NOTE — PROGRESS NOTES
[address], [address] [facility]  [phone]     Subjective:      Ian Trejo is a 75 y.o. male is here for routine f/u on PAF HTN HLD. Has occasional mild sob, unchanged. He is able to do heavy yardwork, play golf; denies exertional sob or cp. Had a negative NST in 8/18  Has infrequent palpitation, < 1 minute, last episode was 3 months ago        The patient denies chest pain, orthopnea, PND, LE edema, palpitations, syncope, or presyncope.        Patient Active Problem List    Diagnosis Date Noted    Aortic valve insufficiency 10/06/2014    Mitral valve regurgitation 10/06/2014    Paroxysmal atrial fibrillation ([facility] [address] 04/15/2013    S/P ablation of atrial fibrillation 11/16/2012    A-fib ([facility] 10/16/2012    Heart palpitations 07/31/2012    Mixed hyperlipidemia 03/07/2012    Obesity, unspecified 03/07/2012    Prostatitis, unspecified 03/07/2012      Leelee Avila MD  Past Medical History:   Diagnosis Date    A-fib [facility])     s/p afib ablation    Arthritis     hands,knees    CAD (coronary artery disease)       Past Surgical History:   Procedure Laterality Date    CARDIAC SURG PROCEDURE UNLIST      ablation for afib    HX GI      hemorroidectomy    HX HERNIA REPAIR      HX OTHER SURGICAL      monitoring device under skin in chest to dx atrial fibrillation    HX TONSILLECTOMY      HX VASECTOMY       Allergies   Allergen Reactions    Claritin [Loratadine] Other (comments)     \"stop urination\"      Family History   Problem Relation Age of Onset    Heart Disease Father       Social History     Socioeconomic History    Marital status:      Spouse name: Not on file    Number of children: Not on file    Years of education: Not on file    Highest education level: Not on file   Occupational History    Not on file   Social Needs    Financial resource strain: Not on file    Food insecurity:     Worry: Not on file     Inability: Not on file   [employer] needs: Medical: Not on file     Non-medical: Not on file   Tobacco Use    Smoking status: Former Smoker     Packs/day: 1.00     Years: 8.00     Pack years: 8.00     Types: Cigarettes     Last attempt to quit: 1973     Years since quittin.1    Smokeless tobacco: Never Used    Tobacco comment: Former cigarette smoker   Substance and Sexual Activity    Alcohol use: Yes     Alcohol/week: 5.8 standard drinks     Types: 7 Cans of beer per week     Comment: 1 beer a day    Drug use: No    Sexual activity: Not on file   Lifestyle    Physical activity:     Days per week: Not on file     Minutes per session: Not on file    Stress: Not on file   Relationships    Social connections:     Talks on phone: Not on file     Gets together: Not on file     Attends Quaker service: Not on file     Active member of club or organization: Not on file     Attends meetings of clubs or organizations: Not on file     Relationship status: Not on file    Intimate partner violence:     Fear of current or ex partner: Not on file     Emotionally abused: Not on file     Physically abused: Not on file     Forced sexual activity: Not on file   Other Topics Concern    Not on file   Social History Narrative    Not on file      Current Outpatient Medications   Medication Sig    metoprolol succinate (TOPROL-XL) 25 mg XL tablet take 1 tablet by mouth once daily    pravastatin (PRAVACHOL) 40 mg tablet Take 40 mg by mouth.  ibuprofen (ADVIL) 200 mg tablet Take 600 mg by mouth as needed for Pain.  tadalafil (CIALIS) 5 mg tablet Take 5 mg by mouth every other day.  aspirin 81 mg tablet Take 81 mg by mouth daily. No current facility-administered medications for this visit.           Review of Symptoms:  11 systems reviewed, negative other than as stated in the HPI    Physical ExamPhysical Exam:    Vitals:    19 1338 19 1345 19 1402   BP: 144/80 138/70 134/72   Pulse: 70     Resp: 16     SpO2: 97% Weight: 219 lb 4.8 oz (99.5 kg)     Height: 6' (1.829 m)       Body mass index is 29.74 kg/m². General PE  Gen:  NAD  Mental Status - Alert. General Appearance - Not in acute distress. HEENT:  PERRL, no carotid bruits or JVD  Chest and Lung Exam   Inspection: Accessory muscles - No use of accessory muscles in breathing. Auscultation:   Breath sounds: - Normal.   Cardiovascular   Inspection: Jugular vein - Bilateral - Inspection Normal.   Palpation/Percussion:   Apical Impulse: - Normal.   Auscultation: Rhythm - Regular. Heart Sounds - S1 WNL and S2 WNL. No S3 or S4. Murmurs & Other Heart Sounds: Auscultation of the heart reveals - No Murmurs. Peripheral Vascular   Upper Extremity: Inspection - Bilateral - No Cyanotic nailbeds or Digital clubbing. Lower Extremity:   Palpation: Edema - Bilateral - No edema. Abdomen:   Soft, non-tender, bowel sounds are active. Neuro: A&O times 3, CN and motor grossly WNL    Labs:   No results found for: CHOL, CHOLX, CHLST, CHOLV, 539944, HDL, LDL, LDLC, DLDLP, TGLX, TRIGL, TRIGP, CHHD, CHHDX  No results found for: CPK, CPKX, CPX  Lab Results   Component Value Date/Time    Sodium 139 07/07/2019 01:09 PM    Potassium 4.7 07/07/2019 01:09 PM    Chloride 105 07/07/2019 01:09 PM    CO2 30 07/07/2019 01:09 PM    Anion gap 4 (L) 07/07/2019 01:09 PM    Glucose 139 (H) 07/07/2019 01:09 PM    BUN 20 07/07/2019 01:09 PM    Creatinine 1.69 (H) 07/07/2019 01:09 PM    BUN/Creatinine ratio 12 07/07/2019 01:09 PM    GFR est AA 48 (L) 07/07/2019 01:09 PM    GFR est non-AA 40 (L) 07/07/2019 01:09 PM    Calcium 9.2 07/07/2019 01:09 PM    Bilirubin, total 0.2 10/13/2014 11:40 PM    AST (SGOT) 18 10/13/2014 11:40 PM    Alk.  phosphatase 116 10/13/2014 11:40 PM    Protein, total 7.2 10/13/2014 11:40 PM    Albumin 3.7 10/13/2014 11:40 PM    Globulin 3.5 10/13/2014 11:40 PM    A-G Ratio 1.1 10/13/2014 11:40 PM    ALT (SGPT) 25 10/13/2014 11:40 PM       EKG:  NSR      Assessment:     Assessment: 1. Paroxysmal atrial fibrillation ([address]    2. Mixed hyperlipidemia    3. S/P ablation of atrial fibrillation    4. SOB (shortness of breath)        Orders Placed This Encounter    AMB POC EKG ROUTINE W/ 12 LEADS, INTER & REP     Order Specific Question:   Reason for Exam:     Answer:   routine        Plan:     Patient presents for f/u, doing well and stable from cardiac standpoint. Has occasional mild sob, unchanged for years. He is able to do heavy yardwork, play golf; denies exertional sob or cp. Had a negative NST in 8/18  Has infrequent palpitation, < 1 minute, last episode was 3 months ago. PAF Hx AF ablation in 2014  Normal EF, mild MR per echo in 10/14  2015: Event monitor: Frequent PAC's and PVC's that correlate with patient symptoms.    On BB, ASA  He will call us if palpitation increase in frequency, intensity or duration   Repeat echo    No ischemia per NST in 8/18    BP controlled    HLD  On statin  Lipids and labs followed by PCP. Continue current care and f/u in 6 months. Hardy Tesfaye NP       [facility]    9/5/2019         Patient seen, examined by me personally. Plan discussed as detailed. Agree with note as outlined by  NP. I confirm findings in history and physical exam. No additional findings noted. Agree with plan as outlined above.      Cayla Bolaños MD

## 2019-09-05 NOTE — PROGRESS NOTES
Chief Complaint   Patient presents with    Irregular Heart Beat     annual  f/u     1. Have you been to the ER, urgent care clinic since your last visit? Hospitalized since your last visit? yes ED [facility] ED 7/7/2019    2. Have you seen or consulted any other health care providers outside of the [address] [place] [address] since your last visit? Include any pap smears or colon screening.  No

## 2019-12-03 DIAGNOSIS — I48.0 PAROXYSMAL ATRIAL FIBRILLATION (HCC): ICD-10-CM

## 2019-12-03 RX ORDER — METOPROLOL SUCCINATE 25 MG/1
TABLET, EXTENDED RELEASE ORAL
Qty: 60 TAB | Refills: 0 | Status: SHIPPED | OUTPATIENT
Start: 2019-12-03 | End: 2020-02-10

## 2020-02-08 DIAGNOSIS — I48.0 PAROXYSMAL ATRIAL FIBRILLATION (HCC): ICD-10-CM

## 2020-02-10 DIAGNOSIS — I48.0 PAROXYSMAL ATRIAL FIBRILLATION (HCC): ICD-10-CM

## 2020-02-10 RX ORDER — METOPROLOL SUCCINATE 25 MG/1
TABLET, EXTENDED RELEASE ORAL
Qty: 90 TAB | Refills: 3 | Status: SHIPPED | OUTPATIENT
Start: 2020-02-10 | End: 2021-02-08

## 2020-02-10 RX ORDER — METOPROLOL SUCCINATE 25 MG/1
TABLET, EXTENDED RELEASE ORAL
Qty: 60 TAB | Refills: 0 | Status: SHIPPED | OUTPATIENT
Start: 2020-02-10 | End: 2020-02-10

## 2020-10-20 ENCOUNTER — OFFICE VISIT (OUTPATIENT)
Dept: CARDIOLOGY CLINIC | Age: 75
End: 2020-10-20

## 2020-10-20 ENCOUNTER — CLINICAL SUPPORT (OUTPATIENT)
Dept: CARDIOLOGY CLINIC | Age: 75
End: 2020-10-20
Payer: MEDICARE

## 2020-10-20 VITALS
HEART RATE: 78 BPM | OXYGEN SATURATION: 98 % | BODY MASS INDEX: 30.37 KG/M2 | RESPIRATION RATE: 18 BRPM | HEIGHT: 72 IN | SYSTOLIC BLOOD PRESSURE: 158 MMHG | WEIGHT: 224.2 LBS | DIASTOLIC BLOOD PRESSURE: 80 MMHG

## 2020-10-20 DIAGNOSIS — E78.2 MIXED HYPERLIPIDEMIA: ICD-10-CM

## 2020-10-20 DIAGNOSIS — R00.2 HEART PALPITATIONS: ICD-10-CM

## 2020-10-20 DIAGNOSIS — Z98.890 S/P ABLATION OF ATRIAL FIBRILLATION: ICD-10-CM

## 2020-10-20 DIAGNOSIS — I48.0 PAROXYSMAL ATRIAL FIBRILLATION (HCC): ICD-10-CM

## 2020-10-20 DIAGNOSIS — Z86.79 S/P ABLATION OF ATRIAL FIBRILLATION: ICD-10-CM

## 2020-10-20 DIAGNOSIS — R00.1 BRADYCARDIA: Primary | ICD-10-CM

## 2020-10-20 DIAGNOSIS — I48.0 PAROXYSMAL ATRIAL FIBRILLATION (HCC): Primary | ICD-10-CM

## 2020-10-20 PROCEDURE — G8427 DOCREV CUR MEDS BY ELIG CLIN: HCPCS | Performed by: INTERNAL MEDICINE

## 2020-10-20 PROCEDURE — G8536 NO DOC ELDER MAL SCRN: HCPCS | Performed by: INTERNAL MEDICINE

## 2020-10-20 PROCEDURE — 93000 ELECTROCARDIOGRAM COMPLETE: CPT | Performed by: INTERNAL MEDICINE

## 2020-10-20 PROCEDURE — 99214 OFFICE O/P EST MOD 30 MIN: CPT | Performed by: INTERNAL MEDICINE

## 2020-10-20 PROCEDURE — 1101F PT FALLS ASSESS-DOCD LE1/YR: CPT | Performed by: INTERNAL MEDICINE

## 2020-10-20 PROCEDURE — 93228 REMOTE 30 DAY ECG REV/REPORT: CPT | Performed by: INTERNAL MEDICINE

## 2020-10-20 PROCEDURE — G8417 CALC BMI ABV UP PARAM F/U: HCPCS | Performed by: INTERNAL MEDICINE

## 2020-10-20 PROCEDURE — G8510 SCR DEP NEG, NO PLAN REQD: HCPCS | Performed by: INTERNAL MEDICINE

## 2020-10-20 NOTE — LETTER
10/20/20 Patient: Jerry Joseph YOB: 1945 Date of Visit: 10/20/2020 [address] [employer] [address] VIA In Basket Dear [address] Marybeth Haley MD, Thank you for referring Mr. Brandt Moses to [address] for evaluation. My notes for this consultation are attached. If you have questions, please do not hesitate to call me. I look forward to following your patient along with you. Sincerely, Cristal Fink MD

## 2020-10-20 NOTE — PROGRESS NOTES
Patient received a 2 week event monitor. Instructions given verbally as well as an instruction sheet. Pt verbalized understanding.     [facility] Event Monitoring

## 2020-10-20 NOTE — PROGRESS NOTES
1. Have you been to the ER, urgent care clinic since your last visit? Hospitalized since your last visit? No    2. Have you seen or consulted any other health care providers outside of the [address] [place] [address] since your last visit? Include any pap smears or colon screening. No    Chief Complaint   Patient presents with    Slow Heart Rate    Other    Shortness of Breath     when sitting and lying down    Palpitations     from time to time       Slow heart rate:  X 1 wk ago; from upper 30s-40s to upper 60s     Heavy feeling to chest; onset x 1 wk ago  Denies difficulty breathing.   Denies chest pain

## 2020-10-20 NOTE — PROGRESS NOTES
Jessica Fischer, [facility]    Subjective/HPI:     Briseyda Carlos is a 67 y.o. male is here for routine f/u. He has a PMHx of PAF s/p AF ablation and HLD. He reports concerns of slow heart rates. Symptoms started about a week ago. He notices heart rates between 30-40s, counted by measuring his radial pulse. This occurs around evening times at rest.  He feels chest tightness and a little out of breath, which prompts him to check his pulse. Symptoms resolve without intervention. During the day, he stays busy and denies any complaints of dizziness, palpitations, chest pain, shortness of breath, fatigue or light-headedness. He denies easy fatiguability. He has also noticed his blood pressure has been elevated by about 10-15 points since these symptoms occurred. He denies any medication changes. PCP Provider  Mustapha Barbosa MD    Past Medical History:   Diagnosis Date    A-fib [facility])     s/p afib ablation    Arthritis     hands,knees        Allergies   Allergen Reactions    Claritin [Loratadine] Other (comments)     \"stop urination\"        Outpatient Encounter Medications as of 10/20/2020   Medication Sig Dispense Refill    metoprolol succinate (TOPROL-XL) 25 mg XL tablet TAKE 1 TABLET BY MOUTH DAILY 90 Tab 3    pravastatin (PRAVACHOL) 40 mg tablet Take 40 mg by mouth.  0    ibuprofen (ADVIL) 200 mg tablet Take 600 mg by mouth as needed for Pain.  tadalafil (CIALIS) 5 mg tablet Take 5 mg by mouth every other day.  aspirin 81 mg tablet Take 81 mg by mouth daily. No facility-administered encounter medications on file as of 10/20/2020. Review of Symptoms:    Review of Systems   Constitutional: Negative for chills, fever and weight loss. HENT: Negative for nosebleeds. Eyes: Negative for blurred vision and double vision. Respiratory: Negative for cough, shortness of breath and wheezing.     Cardiovascular: Negative for chest pain, palpitations, orthopnea, leg swelling and PND. Gastrointestinal: Negative for abdominal pain, blood in stool, diarrhea, nausea and vomiting. Musculoskeletal: Negative for joint pain. Skin: Negative for rash. Neurological: Negative for dizziness, tingling and loss of consciousness. Endo/Heme/Allergies: Does not bruise/bleed easily. Physical Exam:      General: Well developed, in no acute distress, cooperative and alert  HEENT: No carotid bruits, no JVD, trach is midline. Neck Supple, PEERL, EOM intact. Heart:  reg rate and rhythm; normal S1/S2; no murmurs, no gallops or rubs. Respiratory: Clear bilaterally x 4, no wheezing or rales  Abdomen:   Soft, non-tender, no distention, no masses. + BS. Extremities:  Normal cap refill, no cyanosis, atraumatic. No edema. Neuro: A&Ox3, speech clear, gait stable. Skin: Skin color is normal. No rashes or lesions. Non diaphoretic  Vascular: 2+ pulses symmetric in all extremities    Vitals:    10/20/20 1045 10/20/20 1054   BP: (!) 156/86 (!) 158/80   Pulse: 78    Resp: 18    SpO2: 98%    Weight: 224 lb 3.2 oz (101.7 kg)    Height: 6' (1.829 m)        ECG: sinus rhythm    Cardiology Labs:    No results found for: FLP, CHOL, HDL, LDLC, VLDL, CHHD    No results found for: HBA1C, PRE3XBXO, AMW4LOQM, ZMQ8XNQD    Lab Results   Component Value Date/Time    Sodium 139 07/07/2019 01:09 PM    Potassium 4.7 07/07/2019 01:09 PM    Chloride 105 07/07/2019 01:09 PM    CO2 30 07/07/2019 01:09 PM    Glucose 139 (H) 07/07/2019 01:09 PM    BUN 20 07/07/2019 01:09 PM    Creatinine 1.69 (H) 07/07/2019 01:09 PM    BUN/Creatinine ratio 12 07/07/2019 01:09 PM    GFR est AA 48 (L) 07/07/2019 01:09 PM    GFR est non-AA 40 (L) 07/07/2019 01:09 PM    Calcium 9.2 07/07/2019 01:09 PM    Anion gap 4 (L) 07/07/2019 01:09 PM    Bilirubin, total 0.2 10/13/2014 11:40 PM    ALT (SGPT) 25 10/13/2014 11:40 PM    Alk.  phosphatase 116 10/13/2014 11:40 PM    Protein, total 7.2 10/13/2014 11:40 PM    Albumin 3.7 10/13/2014 11:40 PM    Globulin 3.5 10/13/2014 11:40 PM    A-G Ratio 1.1 10/13/2014 11:40 PM          Assessment:       ICD-10-CM ICD-9-CM    1. Paroxysmal atrial fibrillation (HCC)  I48.0 427.31    2. Mixed hyperlipidemia  E78.2 272.2 AMB POC EKG ROUTINE W/ 12 LEADS, INTER & REP   3. S/P ablation of atrial fibrillation  Z98.890 V45.89     Z86.79          Plan:     1. Paroxysmal atrial fibrillation (HCC)  S/p AF ablation in 2014  Echo done 9/2019 with preserved LVEF 50-55% with mild AI and mild MR  Will evaluate with repeat echo given new symptoms  Check event monitor for recurrent AF or conduction abnormalities. Continue Toprol for rate control  On ASA given long absence of Afib; low threshold for resuming [address] if he has recurrent A fib. 2. Mixed hyperlipidemia  Continue statin therapy and low fat, low cholesterol diet  Lipids managed by PCP    3. S/P ablation of atrial fibrillation  S/p PVI x 4 in 11/2012    F/u with Dr. Star Pineda in 1 month    Patient was made aware during visit today that all testing completed would be instantaneously available on their MyChart for review. Discussed that these results will be made available to the provider at the same time. They were advised to wait at least 3 business days to allow for provider's interpretation of results with follow-up before calling our office with concerns about their results. Beverly Tineo NP       [facility]    10/20/2020         Patient seen, examined by me personally. Plan discussed as detailed. Agree with note as outlined by  NP. I confirm findings in history and physical exam. No additional findings noted. Agree with plan as outlined above. Consider stress test if symptoms do not resolve.     Gaylene Mortimer, MD

## 2020-10-22 ENCOUNTER — TELEPHONE (OUTPATIENT)
Dept: CARDIOLOGY CLINIC | Age: 75
End: 2020-10-22

## 2020-10-22 NOTE — TELEPHONE ENCOUNTER
Received automatic transmission from event monitor, dated 10/21/2020 at 2:20 AM.  Patient had new onset A fib, rates 77 bpm.  He was asymptomatic. Has had hx of AF ablation in 2014, off [address] [place] [address]. Called patient and discussed results with patient. Advised we'll continue the event monitor, but would like for him to resume [address] [place] [address]. Start Eliuqis 5 mg BID. Will rx to local pharmacy.     9 Davina Ko NP  10/22/2020   4:24 PM

## 2020-11-11 ENCOUNTER — ANCILLARY PROCEDURE (OUTPATIENT)
Dept: CARDIOLOGY CLINIC | Age: 75
End: 2020-11-11
Payer: MEDICARE

## 2020-11-11 VITALS
HEIGHT: 72 IN | BODY MASS INDEX: 30.34 KG/M2 | DIASTOLIC BLOOD PRESSURE: 80 MMHG | WEIGHT: 224 LBS | SYSTOLIC BLOOD PRESSURE: 158 MMHG

## 2020-11-11 LAB
ECHO AO ASC DIAM: 2.93 CM
ECHO AO ROOT DIAM: 3.28 CM
ECHO AV PEAK GRADIENT: 7.92 MMHG
ECHO AV PEAK VELOCITY: 140.75 CM/S
ECHO EST RA PRESSURE: 3 MMHG
ECHO LA AREA 4C: 18.95 CM2
ECHO LA MAJOR AXIS: 4.45 CM
ECHO LA MINOR AXIS: 1.99 CM
ECHO LA VOL 2C: 80.1 ML (ref 18–58)
ECHO LA VOL 4C: 54.04 ML (ref 18–58)
ECHO LA VOL BP: 71.6 ML (ref 18–58)
ECHO LA VOL/BSA BIPLANE: 32.03 ML/M2 (ref 16–28)
ECHO LA VOLUME INDEX A2C: 35.83 ML/M2 (ref 16–28)
ECHO LA VOLUME INDEX A4C: 24.17 ML/M2 (ref 16–28)
ECHO LV E' LATERAL VELOCITY: 6.51 CM/S
ECHO LV E' SEPTAL VELOCITY: 5.53 CM/S
ECHO LV INTERNAL DIMENSION DIASTOLIC: 4.28 CM (ref 4.2–5.9)
ECHO LV INTERNAL DIMENSION SYSTOLIC: 2.63 CM
ECHO LV ISOVOLUMETRIC RELAXATION TIME (IVRT): 58.99 MS
ECHO LV IVSD: 1.33 CM (ref 0.6–1)
ECHO LV MASS 2D: 213.5 G (ref 88–224)
ECHO LV MASS INDEX 2D: 95.5 G/M2 (ref 49–115)
ECHO LV POSTERIOR WALL DIASTOLIC: 1.33 CM (ref 0.6–1)
ECHO LVOT PEAK GRADIENT: 2.4 MMHG
ECHO LVOT PEAK VELOCITY: 77.41 CM/S
ECHO MV "A" WAVE DURATION: 159.85 MS
ECHO MV A VELOCITY: 92.15 CM/S
ECHO MV AREA PHT: 7.12 CM2
ECHO MV E DECELERATION TIME (DT): 106.58 MS
ECHO MV E VELOCITY: 59.56 CM/S
ECHO MV E/A RATIO: 0.65
ECHO MV E/E' LATERAL: 9.15
ECHO MV E/E' RATIO (AVERAGED): 9.96
ECHO MV E/E' SEPTAL: 10.77
ECHO MV PRESSURE HALF TIME (PHT): 30.91 MS
ECHO RIGHT VENTRICULAR SYSTOLIC PRESSURE (RVSP): 34.42 MMHG
ECHO RV TAPSE: 3.32 CM (ref 1.5–2)
ECHO TV REGURGITANT MAX VELOCITY: 280.27 CM/S
ECHO TV REGURGITANT PEAK GRADIENT: 31.42 MMHG

## 2020-11-11 PROCEDURE — 93306 TTE W/DOPPLER COMPLETE: CPT | Performed by: INTERNAL MEDICINE

## 2020-11-11 NOTE — PROGRESS NOTES
Haley,    Please call the patient and inform that echocardiogram is normal, ejection fraction 55-60%. No concern for heart failure at this time. Mild leakiness of the aortic valve, can be associated with age. Not much to be concerned about at this time. Keep f/u with us as scheduled.     Thanks,  [employer]

## 2020-11-12 NOTE — PROGRESS NOTES
Spoke to patient using 2 identifiers. Per Emma Barrera NP, patient was made aware that echocardiogram is normal, ejection fraction 55-60%. No concern for heart failure at this time. Mild leakiness of the aortic valve, can be associated with age. Not much to be concerned about at this time. Patient verbalized understanding.

## 2020-11-15 NOTE — PROGRESS NOTES
Sabrina Youssef, [facility]    Subjective/HPI:     Louis Gonzalez is a 67 y.o. male is here for routine f/u. He has a PMHx of PAF s/p AF ablation and HLD. He had event monitor done and echo for concerns of bradycardia and SOB symptoms. Event monitor did show recurrent A fib, so OAC was resumed in the form of Eliquis 5 mg BID. He feels much better since starting Eliquis. He has no further symptoms and has been continuing usual activities. He is concerned about use of Advil. He takes it to help with chronic back pain and arthritic joints as a result of his years of work as a . Has stopped Advil since he started Eliquis, but would like to know how often he can take it if required. Tylenol has not helped to manage pain. PCP Provider  Jeanelle Primrose, MD    Past Medical History:   Diagnosis Date    A-fib [facility])     s/p afib ablation    Arthritis     hands,knees        Allergies   Allergen Reactions    Claritin [Loratadine] Other (comments)     \"stop urination\"        Outpatient Encounter Medications as of 11/17/2020   Medication Sig Dispense Refill    apixaban (ELIQUIS) 5 mg tablet Take 1 Tab by mouth two (2) times a day. 180 Tab 3    metoprolol succinate (TOPROL-XL) 25 mg XL tablet TAKE 1 TABLET BY MOUTH DAILY 90 Tab 3    pravastatin (PRAVACHOL) 40 mg tablet Take 40 mg by mouth.  0    tadalafil (CIALIS) 5 mg tablet Take 5 mg by mouth every other day.  [DISCONTINUED] ibuprofen (ADVIL) 200 mg tablet Take 600 mg by mouth as needed for Pain.  [DISCONTINUED] aspirin 81 mg tablet Take 81 mg by mouth daily. No facility-administered encounter medications on file as of 11/17/2020. Review of Symptoms:    Review of Systems   Constitutional: Negative for chills, fever and weight loss. HENT: Negative for nosebleeds. Eyes: Negative for blurred vision and double vision. Respiratory: Negative for cough, shortness of breath and wheezing.     Cardiovascular: Negative for chest pain, palpitations, orthopnea, leg swelling and PND. Gastrointestinal: Negative for abdominal pain, blood in stool, diarrhea, nausea and vomiting. Musculoskeletal: Negative for joint pain. Skin: Negative for rash. Neurological: Negative for dizziness, tingling and loss of consciousness. Endo/Heme/Allergies: Does not bruise/bleed easily. Physical Exam:      General: Well developed, in no acute distress, cooperative and alert  HEENT: No carotid bruits, no JVD, trach is midline. Neck Supple, PEERL, EOM intact. Heart:  reg rate and rhythm; normal S1/S2; no murmurs, no gallops or rubs. Respiratory: Clear bilaterally x 4, no wheezing or rales  Abdomen:   Soft, non-tender, no distention, no masses. + BS. Extremities:  Normal cap refill, no cyanosis, atraumatic. No edema. Neuro: A&Ox3, speech clear, gait stable. Skin: Skin color is normal. No rashes or lesions. Non diaphoretic  Vascular: 2+ pulses symmetric in all extremities    Vitals:    11/17/20 0953 11/17/20 1003   BP: 130/68 130/70   Pulse: 77    Resp: 16    SpO2: 96%    Weight: 223 lb (101.2 kg)    Height: 6' (1.829 m)        ECG: sinus rhythm    Cardiology Labs:    No results found for: FLP, CHOL, HDL, LDLC, VLDL, CHHD    No results found for: HBA1C, JIK2VULQ, WYT2SPJO, BYH3KUIM    Lab Results   Component Value Date/Time    Sodium 139 07/07/2019 01:09 PM    Potassium 4.7 07/07/2019 01:09 PM    Chloride 105 07/07/2019 01:09 PM    CO2 30 07/07/2019 01:09 PM    Glucose 139 (H) 07/07/2019 01:09 PM    BUN 20 07/07/2019 01:09 PM    Creatinine 1.69 (H) 07/07/2019 01:09 PM    BUN/Creatinine ratio 12 07/07/2019 01:09 PM    GFR est AA 48 (L) 07/07/2019 01:09 PM    GFR est non-AA 40 (L) 07/07/2019 01:09 PM    Calcium 9.2 07/07/2019 01:09 PM    Anion gap 4 (L) 07/07/2019 01:09 PM    Bilirubin, total 0.2 10/13/2014 11:40 PM    ALT (SGPT) 25 10/13/2014 11:40 PM    Alk.  phosphatase 116 10/13/2014 11:40 PM    Protein, total 7.2 10/13/2014 11:40 PM    Albumin 3.7 10/13/2014 11:40 PM    Globulin 3.5 10/13/2014 11:40 PM    A-G Ratio 1.1 10/13/2014 11:40 PM          Assessment:       ICD-10-CM ICD-9-CM    1. Paroxysmal atrial fibrillation (HCC)  I48.0 427.31 AMB POC EKG ROUTINE W/ 12 LEADS, INTER & REP   2. Mixed hyperlipidemia  E78.2 272.2    3. S/P ablation of atrial fibrillation  Z98.890 V45.89     Z86.79          Plan:     1. Paroxysmal atrial fibrillation (HCC)  S/p AF ablation in 2014  Event monitor done 11/2020 with recurrent A fib, 7-beat run of NSVT and 4% PVC burden  Echo done 11/2020 with preserved LVEF 55-60% with grade 1 DD, mild AI and mild TR, mildly dilated LA  Check lexiscan stress test given 7-beat run of PSVT  Continue Toprol  Eliquis started, tolerating well. Discussed concomitant use of Advil and Eliquis and increased risk for GI bleeding. He may take Advil as needed, but not on a daily or weekly basis. Discussed to be judicious with his use to minimize bleeding risk. 2. Mixed hyperlipidemia  Continue statin therapy and low fat, low cholesterol diet  Lipids managed by PCP    3. S/P ablation of atrial fibrillation  S/p PVI x 4 in 11/2012    F/u with Dr. Zackary Joy in 6 months if stress is normal    Lizzy Rendon NP       [facility]    11/17/2020         Patient seen, examined by me personally. Plan discussed as detailed. Agree with note as outlined by  NP. I confirm findings in history and physical exam. No additional findings noted. Agree with plan as outlined above.      Christopher Clarke MD

## 2020-11-17 ENCOUNTER — OFFICE VISIT (OUTPATIENT)
Dept: CARDIOLOGY CLINIC | Age: 75
End: 2020-11-17
Payer: MEDICARE

## 2020-11-17 VITALS
BODY MASS INDEX: 30.2 KG/M2 | SYSTOLIC BLOOD PRESSURE: 130 MMHG | WEIGHT: 223 LBS | DIASTOLIC BLOOD PRESSURE: 70 MMHG | HEART RATE: 77 BPM | HEIGHT: 72 IN | RESPIRATION RATE: 16 BRPM | OXYGEN SATURATION: 96 %

## 2020-11-17 DIAGNOSIS — I48.0 PAROXYSMAL ATRIAL FIBRILLATION (HCC): ICD-10-CM

## 2020-11-17 DIAGNOSIS — Z86.79 S/P ABLATION OF ATRIAL FIBRILLATION: ICD-10-CM

## 2020-11-17 DIAGNOSIS — I48.0 PAROXYSMAL ATRIAL FIBRILLATION (HCC): Primary | ICD-10-CM

## 2020-11-17 DIAGNOSIS — Z98.890 S/P ABLATION OF ATRIAL FIBRILLATION: ICD-10-CM

## 2020-11-17 DIAGNOSIS — E78.2 MIXED HYPERLIPIDEMIA: ICD-10-CM

## 2020-11-17 PROCEDURE — G8432 DEP SCR NOT DOC, RNG: HCPCS | Performed by: INTERNAL MEDICINE

## 2020-11-17 PROCEDURE — 99214 OFFICE O/P EST MOD 30 MIN: CPT | Performed by: INTERNAL MEDICINE

## 2020-11-17 PROCEDURE — 1101F PT FALLS ASSESS-DOCD LE1/YR: CPT | Performed by: INTERNAL MEDICINE

## 2020-11-17 PROCEDURE — G8417 CALC BMI ABV UP PARAM F/U: HCPCS | Performed by: INTERNAL MEDICINE

## 2020-11-17 PROCEDURE — G8427 DOCREV CUR MEDS BY ELIG CLIN: HCPCS | Performed by: INTERNAL MEDICINE

## 2020-11-17 PROCEDURE — 93000 ELECTROCARDIOGRAM COMPLETE: CPT | Performed by: INTERNAL MEDICINE

## 2020-11-17 PROCEDURE — G8536 NO DOC ELDER MAL SCRN: HCPCS | Performed by: INTERNAL MEDICINE

## 2020-11-17 NOTE — PROGRESS NOTES
1. Have you been to the ER, urgent care clinic since your last visit? Hospitalized since your last visit? No    2. Have you seen or consulted any other health care providers outside of the [address] [place] [address] since your last visit? Include any pap smears or colon screening. No    Chief Complaint   Patient presents with    Irregular Heart Beat     1 mo f/u    Cholesterol Problem     1 mo f/u     Patient denies cardiac symptoms.

## 2020-11-27 ENCOUNTER — OFFICE VISIT (OUTPATIENT)
Dept: URGENT CARE | Age: 75
End: 2020-11-27
Payer: MEDICARE

## 2020-11-27 VITALS — TEMPERATURE: 98.6 F | HEART RATE: 70 BPM | OXYGEN SATURATION: 97 % | RESPIRATION RATE: 16 BRPM

## 2020-11-27 DIAGNOSIS — Z20.822 ENCOUNTER FOR LABORATORY TESTING FOR COVID-19 VIRUS: Primary | ICD-10-CM

## 2020-11-27 PROCEDURE — 99211 OFF/OP EST MAY X REQ PHY/QHP: CPT | Performed by: NURSE PRACTITIONER

## 2020-11-29 LAB — SARS-COV-2, NAA: NOT DETECTED

## 2020-12-07 ENCOUNTER — ANCILLARY PROCEDURE (OUTPATIENT)
Dept: CARDIOLOGY CLINIC | Age: 75
End: 2020-12-07
Payer: MEDICARE

## 2020-12-07 VITALS
SYSTOLIC BLOOD PRESSURE: 154 MMHG | DIASTOLIC BLOOD PRESSURE: 82 MMHG | HEIGHT: 72 IN | BODY MASS INDEX: 30.2 KG/M2 | WEIGHT: 223 LBS

## 2020-12-07 LAB
STRESS ANGINA INDEX: 0
STRESS BASELINE DIAS BP: 82 MMHG
STRESS BASELINE HR: 71 BPM
STRESS BASELINE SYS BP: 154 MMHG
STRESS ESTIMATED WORKLOAD: 4.6 METS
STRESS EXERCISE DUR MIN: NORMAL MIN:SEC
STRESS PEAK DIAS BP: 80 MMHG
STRESS PEAK SYS BP: 190 MMHG
STRESS PERCENT HR ACHIEVED: 93 %
STRESS POST PEAK HR: 134 BPM
STRESS RATE PRESSURE PRODUCT: NORMAL BPM*MMHG
STRESS SR DUKE TREADMILL SCORE: 2
STRESS ST DEPRESSION: 0 MM
STRESS ST ELEVATION: 0 MM
STRESS TARGET HR: 144 BPM

## 2020-12-07 PROCEDURE — 78452 HT MUSCLE IMAGE SPECT MULT: CPT | Performed by: INTERNAL MEDICINE

## 2020-12-07 PROCEDURE — 93015 CV STRESS TEST SUPVJ I&R: CPT | Performed by: INTERNAL MEDICINE

## 2020-12-07 PROCEDURE — A9502 TC99M TETROFOSMIN: HCPCS | Performed by: INTERNAL MEDICINE

## 2020-12-07 NOTE — PROGRESS NOTES
Please let pt know that the stress test is not showing evidence of blocked arteries to the heart. Follow up with Dr Sherwin Jaramillo in next month to discuss results.

## 2020-12-08 ENCOUNTER — TELEPHONE (OUTPATIENT)
Dept: CARDIOLOGY CLINIC | Age: 75
End: 2020-12-08

## 2020-12-08 NOTE — TELEPHONE ENCOUNTER
----- Message from Victoria Romero NP sent at 12/7/2020  4:13 PM EST -----  Please let pt know that the stress test is not showing evidence of blocked arteries to the heart. Follow up with Dr Alexandru Levi in next month to discuss results. Called pt,verified pt with two pt identifiers, advised pt his stress test is not showing any evidence of blocked arteries to the heart. Looked in last office visit and to f/u in 6 months if stress test is normal. Pt verbalized understanding.

## 2021-02-08 DIAGNOSIS — I48.0 PAROXYSMAL ATRIAL FIBRILLATION (HCC): ICD-10-CM

## 2021-02-08 RX ORDER — METOPROLOL SUCCINATE 25 MG/1
TABLET, EXTENDED RELEASE ORAL
Qty: 90 TAB | Refills: 3 | Status: SHIPPED | OUTPATIENT
Start: 2021-02-08 | End: 2022-02-21

## 2021-06-01 ENCOUNTER — OFFICE VISIT (OUTPATIENT)
Dept: CARDIOLOGY CLINIC | Age: 76
End: 2021-06-01
Payer: MEDICARE

## 2021-06-01 VITALS
BODY MASS INDEX: 31.04 KG/M2 | DIASTOLIC BLOOD PRESSURE: 64 MMHG | RESPIRATION RATE: 18 BRPM | WEIGHT: 229.2 LBS | HEIGHT: 72 IN | HEART RATE: 77 BPM | OXYGEN SATURATION: 97 % | SYSTOLIC BLOOD PRESSURE: 148 MMHG

## 2021-06-01 DIAGNOSIS — Z98.890 S/P ABLATION OF ATRIAL FIBRILLATION: ICD-10-CM

## 2021-06-01 DIAGNOSIS — E78.2 MIXED HYPERLIPIDEMIA: ICD-10-CM

## 2021-06-01 DIAGNOSIS — I48.0 PAROXYSMAL ATRIAL FIBRILLATION (HCC): Primary | ICD-10-CM

## 2021-06-01 DIAGNOSIS — Z86.79 S/P ABLATION OF ATRIAL FIBRILLATION: ICD-10-CM

## 2021-06-01 PROCEDURE — 93000 ELECTROCARDIOGRAM COMPLETE: CPT | Performed by: INTERNAL MEDICINE

## 2021-06-01 PROCEDURE — G8536 NO DOC ELDER MAL SCRN: HCPCS | Performed by: INTERNAL MEDICINE

## 2021-06-01 PROCEDURE — 1101F PT FALLS ASSESS-DOCD LE1/YR: CPT | Performed by: INTERNAL MEDICINE

## 2021-06-01 PROCEDURE — G8427 DOCREV CUR MEDS BY ELIG CLIN: HCPCS | Performed by: INTERNAL MEDICINE

## 2021-06-01 PROCEDURE — G8510 SCR DEP NEG, NO PLAN REQD: HCPCS | Performed by: INTERNAL MEDICINE

## 2021-06-01 PROCEDURE — 99213 OFFICE O/P EST LOW 20 MIN: CPT | Performed by: INTERNAL MEDICINE

## 2021-06-01 PROCEDURE — G8417 CALC BMI ABV UP PARAM F/U: HCPCS | Performed by: INTERNAL MEDICINE

## 2021-06-01 NOTE — LETTER
6/1/2021 Patient: Alexa Lujan YOB: 1945 Date of Visit: 6/1/2021 Dionne Vidal MD 
[address] [address] [address] Via Fax: [phone] Dear Dionne Vidal MD, Thank you for referring Mr. Kristen Gamez to [address] [place], [place] [address]er[address] for evaluation. My notes for this consultation are attached. If you have questions, please do not hesitate to call me. I look forward to following your patient along with you. Sincerely, [id number] Soto MD

## 2021-06-01 NOTE — PROGRESS NOTES
Chief Complaint   Patient presents with    Irregular Heart Beat     6 Month Follow Up; Denies Cardiac Symptoms    Hyperlipidemia     Visit Vitals  BP (!) 148/64 (BP 1 Location: Right arm, BP Patient Position: Sitting, BP Cuff Size: Large adult)   Pulse 77   Resp 18   Ht 6' (1.829 m)   Wt 229 lb 3.2 oz (104 kg)   SpO2 97%   BMI 31.09 kg/m²     1. Have you been to the ER, urgent care clinic since your last visit? Hospitalized since your last visit? No    2. Have you seen or consulted any other health care providers outside of the [address] [place] [address] since your last visit? Include any pap smears or colon screening.  No

## 2021-06-01 NOTE — PROGRESS NOTES
Nisa Mejias, [facility]    Subjective/HPI:     Bala Hernandez is a 67 y.o. male is here for routine f/u. He has a PMHx of PAF s/p AF ablation and HLD. Doing well. Denies complaints of chest pains, dizziness, orthopnea, PND or edema. Feels palpitation symptoms but not worse than baseline. Admits that he takes 3 aleve every 10-14 days to help with arthritic pain. Current Outpatient Medications on File Prior to Visit   Medication Sig Dispense Refill    metoprolol succinate (TOPROL-XL) 25 mg XL tablet TAKE 1 TABLET BY MOUTH EVERY DAY 90 Tab 3    apixaban (ELIQUIS) 5 mg tablet Take 1 Tab by mouth two (2) times a day. 180 Tab 3    pravastatin (PRAVACHOL) 40 mg tablet Take 40 mg by mouth.  0    tadalafil (CIALIS) 5 mg tablet Take 5 mg by mouth every other day. No current facility-administered medications on file prior to visit. Review of Symptoms:    Review of Systems   Constitutional: Negative for chills, fever and weight loss. HENT: Negative for nosebleeds. Eyes: Negative for blurred vision and double vision. Respiratory: Negative for cough, shortness of breath and wheezing. Cardiovascular: Negative for chest pain, palpitations, orthopnea, leg swelling and PND. Skin: Negative for rash. Neurological: Negative for dizziness and loss of consciousness. Physical Exam:      General: Well developed, in no acute distress, cooperative and alert  Heart:  reg rate and rhythm; normal S1/S2; no murmurs, no gallops or rubs. Respiratory: Clear bilaterally x 4, no wheezing or rales  Extremities:  Normal cap refill, no cyanosis, atraumatic. No edema. Vascular: 2+ pulses symmetric in all extremities    Vitals:    06/01/21 0914   BP: (!) 148/64   Pulse: 77   Resp: 18   SpO2: 97%   Weight: 229 lb 3.2 oz (104 kg)   Height: 6' (1.829 m)       ECG done today shows sinus rhythm; PVCs     Assessment:       ICD-10-CM ICD-9-CM    1.  Paroxysmal atrial fibrillation (HCC) I48.0 427.31 AMB POC EKG ROUTINE W/ 12 LEADS, INTER & REP   2. Mixed hyperlipidemia  E78.2 272.2 AMB POC EKG ROUTINE W/ 12 LEADS, INTER & REP   3. S/P ablation of atrial fibrillation  Z98.890 V45.89 AMB POC EKG ROUTINE W/ 12 LEADS, INTER & REP    Z86.79          Plan:     1. Paroxysmal atrial fibrillation (HCC)  S/p AF ablation in 2012  Event monitor done 11/2020 with recurrent A fib, 7-beat run of NSVT and 4% PVC burden  Echo done 11/2020 with preserved LVEF 55-60% with grade 1 DD, mild AI and mild TR, mildly dilated LA  Lexiscan stress test done 12/2020 without evidence of ischemia  Continue Toprol, Eliquis     2. Mixed hyperlipidemia  Continue statin therapy and low fat, low cholesterol diet  Lipids managed by PCP     3. S/P ablation of atrial fibrillation  S/p PVI x 4 in 11/2012    F/u with Dr. Tamika Hernandez in 6 months    [place] [address] [facility]    6/1/2021         Patient seen, examined by me personally. Plan discussed as detailed. Agree with note as outlined by  NP with modifications as noted. My independent physical exam reveals : Physical Exam  Vitals and nursing note reviewed. Cardiovascular:      Rate and Rhythm: Normal rate and regular rhythm. Pulses: Normal pulses. Heart sounds: Normal heart sounds. Pulmonary:      Effort: Pulmonary effort is normal.      Breath sounds: Normal breath sounds. Musculoskeletal:         General: No swelling. Cervical back: Neck supple. Skin:     General: Skin is warm and dry. Neurological:      Mental Status: He is alert and oriented to person, place, and time. Psychiatric:         Mood and Affect: Mood normal.         Behavior: Behavior normal.          No additional findings noted. Agree with plan as outlined above with modifications as noted.      Jonah Joseph MD

## 2021-10-25 RX ORDER — APIXABAN 5 MG/1
TABLET, FILM COATED ORAL
Qty: 180 TABLET | Refills: 3 | Status: SHIPPED | OUTPATIENT
Start: 2021-10-25

## 2022-02-15 ENCOUNTER — TELEPHONE (OUTPATIENT)
Dept: CARDIOLOGY CLINIC | Age: 77
End: 2022-02-15

## 2022-02-15 NOTE — TELEPHONE ENCOUNTER
Patient would like a call about sleep aides.  Call him @[phone]    [facility]

## 2022-02-15 NOTE — TELEPHONE ENCOUNTER
Verified patient with 2 identifiers   Patient has had trouble sleeping for years  He was taking advil PM, however was cautioned by Dr Sumi Jaramillo not to take NSAIDS due to a fib and other meds that he takes. Patient is questioning is there a \"safe\" sleep aid that he could take  He states all OTC sleep aids has a warning label if he has a fib  Please advise.

## 2022-02-16 NOTE — TELEPHONE ENCOUNTER
Verified patient with 2 identifiers   Advised patient to try melatonin or unisom per Heather Wall NP  Patient states he has tried melatonin however will look into unisom  He will also call pcp to check what he has to say

## 2022-02-19 DIAGNOSIS — I48.0 PAROXYSMAL ATRIAL FIBRILLATION (HCC): ICD-10-CM

## 2022-02-21 RX ORDER — METOPROLOL SUCCINATE 25 MG/1
TABLET, EXTENDED RELEASE ORAL
Qty: 90 TABLET | Refills: 3 | Status: SHIPPED | OUTPATIENT
Start: 2022-02-21

## 2022-07-21 ENCOUNTER — HOSPITAL ENCOUNTER (OUTPATIENT)
Age: 77
Setting detail: OUTPATIENT SURGERY
Discharge: HOME | End: 2022-07-21
Attending: INTERNAL MEDICINE | Admitting: INTERNAL MEDICINE
Payer: MEDICARE

## 2022-07-21 VITALS
SYSTOLIC BLOOD PRESSURE: 104 MMHG | OXYGEN SATURATION: 97 % | BODY MASS INDEX: 29.8 KG/M2 | WEIGHT: 220 LBS | TEMPERATURE: 98 F | RESPIRATION RATE: 18 BRPM | HEIGHT: 72 IN | DIASTOLIC BLOOD PRESSURE: 49 MMHG | HEART RATE: 58 BPM

## 2022-07-21 DIAGNOSIS — R07.9 CHEST PAIN, UNSPECIFIED TYPE: ICD-10-CM

## 2022-07-21 LAB — ACT BLD: 295 SECS (ref 79–138)

## 2022-07-21 PROCEDURE — C1769 GUIDE WIRE: HCPCS | Performed by: INTERNAL MEDICINE

## 2022-07-21 PROCEDURE — 77030010221 HC SPLNT WR POS TELE -B: Performed by: INTERNAL MEDICINE

## 2022-07-21 PROCEDURE — 74011000636 HC RX REV CODE- 636: Performed by: INTERNAL MEDICINE

## 2022-07-21 PROCEDURE — 93453 R&L HRT CATH W/VENTRICLGRPHY: CPT | Performed by: INTERNAL MEDICINE

## 2022-07-21 PROCEDURE — 77030019569 HC BND COMPR RAD TERU -B: Performed by: INTERNAL MEDICINE

## 2022-07-21 PROCEDURE — C1887 CATHETER, GUIDING: HCPCS | Performed by: INTERNAL MEDICINE

## 2022-07-21 PROCEDURE — 77030015766: Performed by: INTERNAL MEDICINE

## 2022-07-21 PROCEDURE — 93571 IV DOP VEL&/PRESS C FLO 1ST: CPT | Performed by: INTERNAL MEDICINE

## 2022-07-21 PROCEDURE — 93454 CORONARY ARTERY ANGIO S&I: CPT | Performed by: INTERNAL MEDICINE

## 2022-07-21 PROCEDURE — 77030004549 HC CATH ANGI DX PRF MRTM -A: Performed by: INTERNAL MEDICINE

## 2022-07-21 PROCEDURE — 99153 MOD SED SAME PHYS/QHP EA: CPT | Performed by: INTERNAL MEDICINE

## 2022-07-21 PROCEDURE — 74011250636 HC RX REV CODE- 250/636: Performed by: INTERNAL MEDICINE

## 2022-07-21 PROCEDURE — 85347 COAGULATION TIME ACTIVATED: CPT

## 2022-07-21 PROCEDURE — C1894 INTRO/SHEATH, NON-LASER: HCPCS | Performed by: INTERNAL MEDICINE

## 2022-07-21 PROCEDURE — 74011000250 HC RX REV CODE- 250: Performed by: INTERNAL MEDICINE

## 2022-07-21 PROCEDURE — 99152 MOD SED SAME PHYS/QHP 5/>YRS: CPT | Performed by: INTERNAL MEDICINE

## 2022-07-21 PROCEDURE — 77030008543 HC TBNG MON PRSS MRTM -A: Performed by: INTERNAL MEDICINE

## 2022-07-21 RX ORDER — FENTANYL CITRATE 50 UG/ML
INJECTION, SOLUTION INTRAMUSCULAR; INTRAVENOUS AS NEEDED
Status: DISCONTINUED | OUTPATIENT
Start: 2022-07-21 | End: 2022-07-21 | Stop reason: HOSPADM

## 2022-07-21 RX ORDER — HEPARIN SODIUM 200 [USP'U]/100ML
INJECTION, SOLUTION INTRAVENOUS
Status: COMPLETED | OUTPATIENT
Start: 2022-07-21 | End: 2022-07-21

## 2022-07-21 RX ORDER — MIDAZOLAM HYDROCHLORIDE 1 MG/ML
INJECTION, SOLUTION INTRAMUSCULAR; INTRAVENOUS AS NEEDED
Status: DISCONTINUED | OUTPATIENT
Start: 2022-07-21 | End: 2022-07-21 | Stop reason: HOSPADM

## 2022-07-21 RX ORDER — TAMSULOSIN HYDROCHLORIDE 0.4 MG/1
0.4 CAPSULE ORAL DAILY
COMMUNITY

## 2022-07-21 RX ORDER — VERAPAMIL HYDROCHLORIDE 2.5 MG/ML
INJECTION, SOLUTION INTRAVENOUS AS NEEDED
Status: DISCONTINUED | OUTPATIENT
Start: 2022-07-21 | End: 2022-07-21 | Stop reason: HOSPADM

## 2022-07-21 RX ORDER — LIDOCAINE HYDROCHLORIDE 10 MG/ML
INJECTION INFILTRATION; PERINEURAL AS NEEDED
Status: DISCONTINUED | OUTPATIENT
Start: 2022-07-21 | End: 2022-07-21 | Stop reason: HOSPADM

## 2022-07-21 RX ORDER — HEPARIN SODIUM 1000 [USP'U]/ML
INJECTION, SOLUTION INTRAVENOUS; SUBCUTANEOUS AS NEEDED
Status: DISCONTINUED | OUTPATIENT
Start: 2022-07-21 | End: 2022-07-21 | Stop reason: HOSPADM

## 2022-07-21 NOTE — Clinical Note
TRANSFER - OUT REPORT:     Verbal report given to: Angela Sanchez. Report consisted of patient's Situation, Background, Assessment and   Recommendations(SBAR). Opportunity for questions and clarification was provided. Patient transported with a Registered Nurse.

## 2022-07-21 NOTE — PROGRESS NOTES
Primary Nurse Kevin Garcia and Thuy Aleman, RN performed a dual skin assessment on this patient No impairment noted  Mat score is 23; meplix on sacrum

## 2022-07-21 NOTE — PROGRESS NOTES
TRANSFER - IN REPORT:    Verbal report received from oren palma on Tyrell Paul  being received from cath lab for routine progression of care. Report consisted of patients Situation, Background, Assessment and Recommendations(SBAR). Information from the following report(s) SBAR was reviewed with the receiving clinician. Opportunity for questions and clarification was provided. Assessment completed upon patients arrival to [address] [place] [address] and care assumed. Cardiac Cath Lab Recovery Arrival Note:    Tyrell Paul arrived to [facility] recovery area. Patient procedure= heart cath. Patient on cardiac monitor, non-invasive blood pressure, SPO2 monitor. On RA . IV  of nacl on pump at 75 ml/hr. Patient status doing well without problems. Patient is A&Ox 4. Patient reports no complaints. PROCEDURE SITE CHECK:    Procedure site:without any bleeding and no hematoma, no pain/discomfort reported at procedure site. No change in patient status. Continue to monitor patient and status.

## 2022-07-21 NOTE — PROGRESS NOTES
Patient ambulated in hallway with steady gait; no complaints sob, dizziness, discomfort. Right radial site clean dry and intact. Discharge instructions reviewed with patient and patients family; answered questions as needed. Transported patient in wheelchair to car.

## 2022-07-21 NOTE — PROGRESS NOTES
Cardiac Cath Lab Recovery Arrival Note:      Fredy Morales arrived to Cardiac Cath Lab, Recovery Area. Staff introduced to patient. Patient identifiers verified with NAME and DATE OF BIRTH. Procedure verified with patient. Consent forms reviewed and signed by patient or authorized representative and verified. Allergies verified. Patient and family oriented to department. Patient and family informed of procedure and plan of care. Questions answered with review. Patient prepped for procedure, per orders from physician, prior to arrival.    Patient on cardiac monitor, non-invasive blood pressure, SPO2 monitor. On RA. Patient is A&Ox 4. Patient reports no complaints. Patient in stretcher, in low position, with side rails up, call bell within reach, patient instructed to call if assistance as needed. Patient prep in: [address], [place] [address]. Patient family has pager # none  Family in: Meriel Isaiah wife in lobby.    Prep by: cydney louie and rajna louie

## 2022-07-21 NOTE — DISCHARGE SUMMARY
[address] [place] [address]  [phone]      [address] [place] [address] INSTRUCTIONS      Patient ID:  Herlinda Malloy  [id number]  28 y.o.  1945    Admit Date: 7/21/2022    Discharge Date: 7/21/2022     Admitting Physician: Jonah Hoffman MD     Discharge Physician: Zenaida Harris NP    Admission Diagnoses:   Chest pain, unspecified type [R07.9]    Discharge Diagnoses: Active Problems:    * No active hospital problems. *  Non obstructive CAD    Discharge Condition: Good    Cardiology Procedures this Admission:  Diagnostic left heart catheterization    Disposition: home      Reference discharge instructions provided by nursing for diet and activity. Follow up in 2 weeks     Signed: Zenaida Harris NP  7/21/2022  11:59 AM    CARDIAC CATHETERIZATION/PCI DISCHARGE INSTRUCTIONS    It is normal to feel tired the first couple days. Take it easy and follow the physicians instructions. CHECK THE CATHETER INSERTION SITE DAILY:    You may shower 24 hours after the procedure, remove the bandage during showering. Wash with soap and water and pat dry. Gentle cleaning of the site with soap and water is sufficient, cover with a dry clean dressing or bandage. Do not apply creams or powders to the area. Do not sit in a bathtub or pool of water for 7 days or until wound has completely healed. Temporary bruising and discomfort is normal and may last a few weeks. You may have a  formation of a small lump at the site which may last up to 6 weeks. CALL THE PHYSICIANS:    If the site becomes red, swollen or feels warm to the touch  If there is bleeding or drainage or if there is unusual pain at the groin or down the leg. If there is any bleeding, lie down, apply pressure or have someone apply pressure with a clean cloth until the bleeding stops.    If the bleeding continues, call 911 to be transported to the hospital.  DO NOT DRIVE YOURSELF, Arnaldo 911.    ACTIVITY:    For the first 24-48 hours or as instructed by the physician:  No lifting, pushing or pulling over 10 pounds and no straining the insertion site. Do not life grocery bags or the garbage can, do not run the vacuum  or  for 7 days. Start with short walks as in the hospital and gradually increase as tolerated each day. It is recommended to walk 30 minutes 5-7 days per week. Follow your physicians instructions on activity. Avoid walking outside in extremes of heat or cold. Walk inside when it is cold and windy or hot and humid. Things to keep in mind:  No driving for at least 24 hours, or as designated by your physician. Limit the number of times you go up and down the stairs  Take rests and pace yourself with activity. Be careful and do not strain with bowel movements. MEDICATIONS:    Take all medications as prescribed  Call your physician if you have any questions  Keep an updated list of your medications with you at all times and give a list to your physician and pharmacist        SIGNS AND SYMPTOMS:    Be cautious of symptoms of angina or recurrent symptoms such as chest discomfort, unusual shortness of breath or fatigue. These could be symptoms of restenosis, a new blockage or a heart attack. If your symptoms are relieved with rest it is still recommended that you notify your physician of recurrent chest pain or discomfort. FOR CHEST PAIN or symptoms of angina not relieved with rest:  If the discomfort is not relieved with rest, and you have been prescribed Nitroglycerin, take as directed (taken under the tongue, one at a time 5 minutes apart for a total of 3 doses). If the discomfort is not relieved after the 3rd nitroglycerin, call 911. If you have not been prescribed Nitroglycerin  and your chest discomfort is not relieved with rest, call 911. AFTER CARE:    Follow up with your physician as instructed.   Follow a heart healthy diet with proper portion control, daily stress management, daily exercise, blood pressure and cholesterol control , and smoking cessation.

## 2022-07-21 NOTE — CARDIO/PULMONARY
Cardiac Rehab Note: chart review/referral     Cardiac cath without intervention 7/21/22:  \"Non obstructive CAD\"    Smoking history assessed. Patient is a former smoker.    Smoking Cessation Program link has not been added to the AVS.

## 2023-09-08 ENCOUNTER — HOSPITAL ENCOUNTER (OUTPATIENT)
Facility: HOSPITAL | Age: 78
Discharge: HOME | End: 2023-09-08
Attending: INTERNAL MEDICINE | Admitting: INTERNAL MEDICINE
Payer: MEDICARE

## 2023-09-08 ENCOUNTER — ANESTHESIA EVENT (OUTPATIENT)
Facility: HOSPITAL | Age: 78
End: 2023-09-08
Payer: MEDICARE

## 2023-09-08 ENCOUNTER — ANESTHESIA (OUTPATIENT)
Facility: HOSPITAL | Age: 78
End: 2023-09-08
Payer: MEDICARE

## 2023-09-08 VITALS
WEIGHT: 223 LBS | HEIGHT: 72 IN | TEMPERATURE: 97.5 F | OXYGEN SATURATION: 94 % | HEART RATE: 83 BPM | SYSTOLIC BLOOD PRESSURE: 143 MMHG | RESPIRATION RATE: 16 BRPM | BODY MASS INDEX: 30.2 KG/M2 | DIASTOLIC BLOOD PRESSURE: 75 MMHG

## 2023-09-08 DIAGNOSIS — I47.1 SUPRAVENTRICULAR TACHYCARDIA (HCC): ICD-10-CM

## 2023-09-08 LAB — ECHO BSA: 2.27 M2

## 2023-09-08 PROCEDURE — 3700000001 HC ADD 15 MINUTES (ANESTHESIA): Performed by: INTERNAL MEDICINE

## 2023-09-08 PROCEDURE — C1894 INTRO/SHEATH, NON-LASER: HCPCS | Performed by: INTERNAL MEDICINE

## 2023-09-08 PROCEDURE — 6360000002 HC RX W HCPCS: Performed by: NURSE ANESTHETIST, CERTIFIED REGISTERED

## 2023-09-08 PROCEDURE — C1766 INTRO/SHEATH,STRBLE,NON-PEEL: HCPCS | Performed by: INTERNAL MEDICINE

## 2023-09-08 PROCEDURE — 2500000003 HC RX 250 WO HCPCS: Performed by: NURSE ANESTHETIST, CERTIFIED REGISTERED

## 2023-09-08 PROCEDURE — C1732 CATH, EP, DIAG/ABL, 3D/VECT: HCPCS | Performed by: INTERNAL MEDICINE

## 2023-09-08 PROCEDURE — 93653 COMPRE EP EVAL TX SVT: CPT | Performed by: INTERNAL MEDICINE

## 2023-09-08 PROCEDURE — 2720000010 HC SURG SUPPLY STERILE: Performed by: INTERNAL MEDICINE

## 2023-09-08 PROCEDURE — 2500000003 HC RX 250 WO HCPCS: Performed by: INTERNAL MEDICINE

## 2023-09-08 PROCEDURE — 2709999900 HC NON-CHARGEABLE SUPPLY: Performed by: INTERNAL MEDICINE

## 2023-09-08 PROCEDURE — 3700000000 HC ANESTHESIA ATTENDED CARE: Performed by: INTERNAL MEDICINE

## 2023-09-08 PROCEDURE — 2580000003 HC RX 258: Performed by: NURSE ANESTHETIST, CERTIFIED REGISTERED

## 2023-09-08 PROCEDURE — C1730 CATH, EP, 19 OR FEW ELECT: HCPCS | Performed by: INTERNAL MEDICINE

## 2023-09-08 PROCEDURE — 2580000003 HC RX 258: Performed by: INTERNAL MEDICINE

## 2023-09-08 PROCEDURE — C1893 INTRO/SHEATH, FIXED,NON-PEEL: HCPCS | Performed by: INTERNAL MEDICINE

## 2023-09-08 PROCEDURE — C1731 CATH, EP, 20 OR MORE ELEC: HCPCS | Performed by: INTERNAL MEDICINE

## 2023-09-08 RX ORDER — ACETAMINOPHEN 325 MG/1
650 TABLET ORAL EVERY 4 HOURS PRN
Status: DISCONTINUED | OUTPATIENT
Start: 2023-09-08 | End: 2023-09-08 | Stop reason: HOSPADM

## 2023-09-08 RX ORDER — LIDOCAINE HYDROCHLORIDE 10 MG/ML
INJECTION, SOLUTION EPIDURAL; INFILTRATION; INTRACAUDAL; PERINEURAL PRN
Status: DISCONTINUED | OUTPATIENT
Start: 2023-09-08 | End: 2023-09-08 | Stop reason: HOSPADM

## 2023-09-08 RX ORDER — SODIUM CHLORIDE 9 MG/ML
INJECTION, SOLUTION INTRAVENOUS PRN
Status: DISCONTINUED | OUTPATIENT
Start: 2023-09-08 | End: 2023-09-08 | Stop reason: HOSPADM

## 2023-09-08 RX ORDER — PHENYLEPHRINE HCL IN 0.9% NACL 0.4MG/10ML
SYRINGE (ML) INTRAVENOUS PRN
Status: DISCONTINUED | OUTPATIENT
Start: 2023-09-08 | End: 2023-09-08 | Stop reason: SDUPTHER

## 2023-09-08 RX ORDER — LIDOCAINE HYDROCHLORIDE 20 MG/ML
INJECTION, SOLUTION EPIDURAL; INFILTRATION; INTRACAUDAL; PERINEURAL PRN
Status: DISCONTINUED | OUTPATIENT
Start: 2023-09-08 | End: 2023-09-08 | Stop reason: SDUPTHER

## 2023-09-08 RX ORDER — 0.9 % SODIUM CHLORIDE 0.9 %
INTRAVENOUS SOLUTION INTRAVENOUS PRN
Status: DISCONTINUED | OUTPATIENT
Start: 2023-09-08 | End: 2023-09-08 | Stop reason: SDUPTHER

## 2023-09-08 RX ORDER — SODIUM CHLORIDE 0.9 % (FLUSH) 0.9 %
5-40 SYRINGE (ML) INJECTION EVERY 12 HOURS SCHEDULED
Status: DISCONTINUED | OUTPATIENT
Start: 2023-09-08 | End: 2023-09-08 | Stop reason: HOSPADM

## 2023-09-08 RX ORDER — DEXMEDETOMIDINE HYDROCHLORIDE 100 UG/ML
INJECTION, SOLUTION INTRAVENOUS PRN
Status: DISCONTINUED | OUTPATIENT
Start: 2023-09-08 | End: 2023-09-08 | Stop reason: SDUPTHER

## 2023-09-08 RX ORDER — EPHEDRINE SULFATE/0.9% NACL/PF 50 MG/5 ML
SYRINGE (ML) INTRAVENOUS PRN
Status: DISCONTINUED | OUTPATIENT
Start: 2023-09-08 | End: 2023-09-08 | Stop reason: SDUPTHER

## 2023-09-08 RX ORDER — SODIUM CHLORIDE 0.9 % (FLUSH) 0.9 %
5-40 SYRINGE (ML) INJECTION PRN
Status: DISCONTINUED | OUTPATIENT
Start: 2023-09-08 | End: 2023-09-08 | Stop reason: HOSPADM

## 2023-09-08 RX ORDER — HEPARIN SODIUM 10000 [USP'U]/ML
INJECTION, SOLUTION INTRAVENOUS; SUBCUTANEOUS PRN
Status: DISCONTINUED | OUTPATIENT
Start: 2023-09-08 | End: 2023-09-08 | Stop reason: HOSPADM

## 2023-09-08 RX ADMIN — PROPOFOL 150 MCG/KG/MIN: 10 INJECTION, EMULSION INTRAVENOUS at 08:57

## 2023-09-08 RX ADMIN — Medication 300 MCG: at 09:29

## 2023-09-08 RX ADMIN — Medication 100 MCG: at 09:20

## 2023-09-08 RX ADMIN — SODIUM CHLORIDE 250 ML: 9 INJECTION, SOLUTION INTRAVENOUS at 09:06

## 2023-09-08 RX ADMIN — Medication 40 MG: at 09:32

## 2023-09-08 RX ADMIN — Medication 100 MCG: at 09:15

## 2023-09-08 RX ADMIN — Medication 200 MCG: at 09:27

## 2023-09-08 RX ADMIN — DEXMEDETOMIDINE HYDROCHLORIDE 5 MCG: 100 INJECTION, SOLUTION, CONCENTRATE INTRAVENOUS at 08:55

## 2023-09-08 RX ADMIN — Medication 200 MCG: at 09:32

## 2023-09-08 RX ADMIN — LIDOCAINE HYDROCHLORIDE 100 MG: 20 INJECTION, SOLUTION EPIDURAL; INFILTRATION; INTRACAUDAL; PERINEURAL at 08:58

## 2023-09-08 RX ADMIN — SODIUM CHLORIDE 500 ML: 9 INJECTION, SOLUTION INTRAVENOUS at 09:54

## 2023-09-08 RX ADMIN — Medication 100 MCG: at 09:22

## 2023-09-08 RX ADMIN — Medication 10 MG: at 09:30

## 2023-09-08 NOTE — PROGRESS NOTES
1200: Discharge instruction given to the patient, informed to call MD for follow-up appointment, verbalized understanding. 1400: Ambulated patient to the bathroom without difficulty, voids ok. Right groin dressing CDI no hematoma no bleeding no pain.      1420: Patient stable for discharge PIV removed escorted to car via wheelchair accompanied by family members

## (undated) DEVICE — GLIDESHEATH SLENDER ACCESS KIT: Brand: GLIDESHEATH SLENDER

## (undated) DEVICE — ELECTRODE PT RET AD L9FT HI MOIST COND ADH HYDRGEL CORDED

## (undated) DEVICE — MEDI-TRACE CADENCE ADULT, DEFIBRILLATION ELECTRODE -RTS  (10 PR/PK) - PHYSIO-CONTROL: Brand: MEDI-TRACE CADENCE

## (undated) DEVICE — CABLE EP L150CM GRY BPLR QPLR FOR 4FR QPLR CATH SUPREME

## (undated) DEVICE — PINNACLE INTRODUCER SHEATH: Brand: PINNACLE

## (undated) DEVICE — CABLE CATH L10FT RED PIN CONN 25-34 FOR NAVISTAR CARTO 3

## (undated) DEVICE — SPLINT WR POS F/ARTERIAL ACC -- BX/10

## (undated) DEVICE — RADIFOCUS OPTITORQUE ANGIOGRAPHIC CATHETER: Brand: OPTITORQUE

## (undated) DEVICE — CATHETER ABLAT 7FR L115CM 1-7-4MM SPC TIP 4MM 4 ELECTRD

## (undated) DEVICE — INTRODUCER SHTH 8FR L63CM 8FR DIL GWIRE L145CM DIA0.038IN

## (undated) DEVICE — TR BAND RADIAL ARTERY COMPRESSION DEVICE: Brand: TR BAND

## (undated) DEVICE — PRESSURE GUIDEWIRE: Brand: COMET

## (undated) DEVICE — CABLE EP L150CM RED CONNECTS W/ 4FR SUPREME BPLR QPLR CATH

## (undated) DEVICE — 3M™ TEGADERM™ TRANSPARENT FILM DRESSING FRAME STYLE, 1626W, 4 IN X 4-3/4 IN (10 CM X 12 CM), 50/CT 4CT/CASE: Brand: 3M™ TEGADERM™

## (undated) DEVICE — CABLE EP L150CM RED HEXAPOLAR OCTAPOLAR DECAPOLAR EXTN CONN

## (undated) DEVICE — SHEATH GUID 11.5X8.5FR L71MM M CRV L22MM BIDIR STEER CARTO

## (undated) DEVICE — HI-TORQUE VERSACORE FLOPPY GUIDE WIRE SYSTEM 145 CM: Brand: HI-TORQUE VERSACORE

## (undated) DEVICE — TUBING PRSS MON L6IN PVC M FEM CONN

## (undated) DEVICE — GUIDEWIRE VASC L260CM 0.035IN J TIP L3MM PTFE FIX COR NAMIC

## (undated) DEVICE — Device

## (undated) DEVICE — CABLE EP L150CM BLK HEXAPOLAR OCTAPOLAR DECAPOLAR EXTN CONN

## (undated) DEVICE — CATHETER ELECTROPHYSIOLOGY LG 2-8-2 MM 7 FRX95 CM LIVEWIRE

## (undated) DEVICE — CATH GUID COR EB35 6FR 100CM -- LAUNCHER

## (undated) DEVICE — HEART CATH-MRMC: Brand: MEDLINE INDUSTRIES, INC.

## (undated) DEVICE — CATHETER ETER ANGIO L110CM OD5FR ID046IN L75CM 038IN 145DEG CARD

## (undated) DEVICE — PATCH REF EXT FOR CARTO 3 SYS (EA = 6 PACKS)

## (undated) DEVICE — CATHETER ELECTROPHYSIOLOGY JSN 2-5-2 MM 6 FRX120 CM SUPREME